# Patient Record
Sex: FEMALE | Race: WHITE | NOT HISPANIC OR LATINO | Employment: FULL TIME | URBAN - METROPOLITAN AREA
[De-identification: names, ages, dates, MRNs, and addresses within clinical notes are randomized per-mention and may not be internally consistent; named-entity substitution may affect disease eponyms.]

---

## 2017-02-03 ENCOUNTER — TELEPHONE (OUTPATIENT)
Dept: FAMILY MEDICINE | Facility: CLINIC | Age: 27
End: 2017-02-03

## 2017-02-03 DIAGNOSIS — Z30.41 ENCOUNTER FOR SURVEILLANCE OF CONTRACEPTIVE PILLS: Primary | ICD-10-CM

## 2017-02-03 RX ORDER — DROSPIRENONE AND ETHINYL ESTRADIOL 0.02-3(28)
1 KIT ORAL DAILY
Qty: 84 TABLET | Refills: 3 | Status: SHIPPED | OUTPATIENT
Start: 2017-02-03 | End: 2018-09-24

## 2017-02-03 NOTE — TELEPHONE ENCOUNTER
[place] 28's      Last Written Prescription Date:  n/a  Last Fill Quantity: 90,   # refills: 4  Last Office Visit with [facility], P or [facility] prescribing provider: 9/13/16  Future Office visit:       Routing refill request to provider for review/approval because:  Not on patient medication list-pt reported    This was not called in by patient-this was sent by [facility] via fax

## 2017-02-03 NOTE — TELEPHONE ENCOUNTER
Luciana -- please review. Unsure if this is the same as what she was taking before.    Thank you  HOWARD VargasN, RN  [facility]

## 2017-09-21 DIAGNOSIS — Z30.41 ENCOUNTER FOR SURVEILLANCE OF CONTRACEPTIVE PILLS: ICD-10-CM

## 2017-09-21 NOTE — TELEPHONE ENCOUNTER
Anamika Oral Tablet 3-0.02 MG        Last Written Prescription Date:  2/3/17  Last Fill Quantity: 84,   # refills: 3  Last Office Visit with [email or web address] or [facility] prescribing provider: 9/13/16  Future Office visit:       Routing refill request to provider for review/approval because:  Drug not on the [facility], [email or web address] or [facility] refill protocol or controlled substance

## 2017-09-22 RX ORDER — DROSPIRENONE AND ETHINYL ESTRADIOL 0.02-3(28)
KIT ORAL
Qty: 28 TABLET | Refills: 0 | Status: SHIPPED | OUTPATIENT
Start: 2017-09-22 | End: 2017-11-10

## 2017-09-22 NOTE — TELEPHONE ENCOUNTER
Prescription approved per [facility] Refill Protocol.    Sonya Canales RN  [facility]

## 2017-11-07 ENCOUNTER — OFFICE VISIT (OUTPATIENT)
Dept: FAMILY MEDICINE | Facility: CLINIC | Age: 27
End: 2017-11-07
Payer: COMMERCIAL

## 2017-11-07 VITALS
WEIGHT: 134.4 LBS | DIASTOLIC BLOOD PRESSURE: 72 MMHG | TEMPERATURE: 97.7 F | HEIGHT: 69 IN | SYSTOLIC BLOOD PRESSURE: 131 MMHG | HEART RATE: 71 BPM | OXYGEN SATURATION: 99 % | BODY MASS INDEX: 19.91 KG/M2

## 2017-11-07 DIAGNOSIS — D50.8 IRON DEFICIENCY ANEMIA SECONDARY TO INADEQUATE DIETARY IRON INTAKE: ICD-10-CM

## 2017-11-07 DIAGNOSIS — Z00.00 ROUTINE GENERAL MEDICAL EXAMINATION AT A HEALTH CARE FACILITY: Primary | ICD-10-CM

## 2017-11-07 DIAGNOSIS — R68.83 CHILLS (WITHOUT FEVER): ICD-10-CM

## 2017-11-07 DIAGNOSIS — Z12.4 SCREENING FOR CERVICAL CANCER: ICD-10-CM

## 2017-11-07 DIAGNOSIS — R19.7 DIARRHEA, UNSPECIFIED TYPE: ICD-10-CM

## 2017-11-07 LAB
ERYTHROCYTE [DISTWIDTH] IN BLOOD BY AUTOMATED COUNT: 17.9 % (ref 10–15)
HCT VFR BLD AUTO: 28.6 % (ref 35–47)
HGB BLD-MCNC: 8.3 G/DL (ref 11.7–15.7)
MCH RBC QN AUTO: 19.2 PG (ref 26.5–33)
MCHC RBC AUTO-ENTMCNC: 29 G/DL (ref 31.5–36.5)
MCV RBC AUTO: 66 FL (ref 78–100)
PLATELET # BLD AUTO: 347 10E9/L (ref 150–450)
RBC # BLD AUTO: 4.32 10E12/L (ref 3.8–5.2)
TSH SERPL DL<=0.005 MIU/L-ACNC: 1.68 MU/L (ref 0.4–4)
WBC # BLD AUTO: 9.5 10E9/L (ref 4–11)

## 2017-11-07 PROCEDURE — 85027 COMPLETE CBC AUTOMATED: CPT | Performed by: NURSE PRACTITIONER

## 2017-11-07 PROCEDURE — G0145 SCR C/V CYTO,THINLAYER,RESCR: HCPCS | Performed by: NURSE PRACTITIONER

## 2017-11-07 PROCEDURE — 99395 PREV VISIT EST AGE 18-39: CPT | Performed by: NURSE PRACTITIONER

## 2017-11-07 PROCEDURE — 84443 ASSAY THYROID STIM HORMONE: CPT | Performed by: NURSE PRACTITIONER

## 2017-11-07 PROCEDURE — 36415 COLL VENOUS BLD VENIPUNCTURE: CPT | Performed by: NURSE PRACTITIONER

## 2017-11-07 PROCEDURE — 99213 OFFICE O/P EST LOW 20 MIN: CPT | Mod: 25 | Performed by: NURSE PRACTITIONER

## 2017-11-07 NOTE — NURSING NOTE
"Chief Complaint   Patient presents with     Physical       Initial /72  Pulse 71  Temp 97.7  F (36.5  C) (Oral)  Ht 5' 9\" (1.753 m)  Wt 134 lb 6.4 oz (61 kg)  SpO2 99%  BMI 19.85 kg/m2 Estimated body mass index is 19.85 kg/(m^2) as calculated from the following:    Height as of this encounter: 5' 9\" (1.753 m).    Weight as of this encounter: 134 lb 6.4 oz (61 kg).  Medication Reconciliation: complete   Lindsay Maldonado MA      "

## 2017-11-07 NOTE — MR AVS SNAPSHOT
After Visit Summary   11/7/2017    Elizabeth Benito    MRN: [id number]           Patient Information     Date Of Birth          1990        Visit Information        Provider Department      11/7/2017 9:00 AM Luciana Becker APRN [facility]        Today's Diagnoses     Diarrhea, unspecified type    -  1    Chills (without fever)        Screening for cervical cancer          Care Instructions      Preventive Health Recommendations  Female Ages 18 to 25     Yearly exam:     See your health care provider every year in order to  o Review health changes.   o Discuss preventive care.    o Review your medicines if your doctor has prescribed any.      You should be tested each year for STDs (sexually transmitted diseases).       After age 20, talk to your provider about how often you should have cholesterol testing.      Starting at age 21, get a Pap test every three years. If you have an abnormal result, your doctor may have you test more often.      If you are at risk for diabetes, you should have a diabetes test (fasting glucose).     Shots:     Get a flu shot each year.     Get a tetanus shot every 10 years.     Consider getting the shot (vaccine) that prevents cervical cancer (Gardasil).    Nutrition:     Eat at least 5 servings of fruits and vegetables each day.    Eat whole-grain bread, whole-wheat pasta and brown rice instead of white grains and rice.    Talk to your provider about Calcium and Vitamin D.     Lifestyle    Exercise at least 150 minutes a week each week (30 minutes a day, 5 days a week). This will help you control your weight and prevent disease.    Limit alcohol to one drink per day.    No smoking.     Wear sunscreen to prevent skin cancer.    See your dentist every six months for an exam and cleaning.          Follow-ups after your visit        Future tests that were ordered for you today     Open Future Orders        Priority Expected Expires Ordered    Ova  "and Parasite Exam Routine Routine  2018            Who to contact     If you have questions or need follow up information about today's clinic visit or your schedule please contact [facility] directly at [phone].  Normal or non-critical lab and imaging results will be communicated to you by MyChart, letter or phone within 4 business days after the clinic has received the results. If you do not hear from us within 7 days, please contact the clinic through Exit41hart or phone. If you have a critical or abnormal lab result, we will notify you by phone as soon as possible.  Submit refill requests through [employer] or call your pharmacy and they will forward the refill request to us. Please allow 3 business days for your refill to be completed.          Additional Information About Your Visit        Exit41harApplango Information     [employer] lets you send messages to your doctor, view your test results, renew your prescriptions, schedule appointments and more. To sign up, go to [email or web address] . Click on \"Log in\" on the left side of the screen, which will take you to the Welcome page. Then click on \"Sign up Now\" on the right side of the page.     You will be asked to enter the access code listed below, as well as some personal information. Please follow the directions to create your username and password.     Your access code is: [email or web address]  Expires: 2018  9:43 AM     Your access code will  in 90 days. If you need help or a new code, please call your [facility] or [phone].        Care EveryWhere ID     This is your Care EveryWhere ID. This could be used by other organizations to access your [place] medical records  [id number]        Your Vitals Were     Pulse Temperature Height Pulse Oximetry BMI (Body Mass Index)       71 97.7  F (36.5  C) (Oral) 5' 9\" (1.753 m) 99% 19.85 kg/m2        Blood Pressure from Last 3 Encounters:   17 131/72   16 132/81 "   09/03/15 109/66    Weight from Last 3 Encounters:   11/07/17 134 lb 6.4 oz (61 kg)   09/13/16 133 lb (60.3 kg)   09/03/15 133 lb 4.8 oz (60.5 kg)              We Performed the Following     CBC with platelets     Pap imaged thin layer screen reflex to HPV if ASCUS - recommend age 25 - 29     TSH with free T4 reflex        Primary Care Provider Office Phone # Fax #    Luciana Becker, APRN [facility] [phone]       [address] [facility] [address]  [facility] [address]        Equal Access to Services     KILLIAN Alliance HospitalVENANCIO : Hadii mani tena hadsoo Sorosa, waaxda jose maria, qacasper kaalmaguy hernandez, jodie espinosa . So [facility] [phone].    ATENCIÓN: Si habla español, tiene a stevenson disposición servicios gratuitos de asistencia lingüística. [facility] [phone].    We comply with applicable federal civil rights laws and [place] laws. We do not discriminate on the basis of race, color, national origin, age, disability, sex, sexual orientation, or gender identity.            Thank you!     Thank you for choosing [facility]  for your care. Our goal is always to provide you with excellent care. Hearing back from our patients is one way we can continue to improve our services. Please take a few minutes to complete the written survey that you may receive in the mail after your visit with us. Thank you!             Your Updated Medication List - Protect others around you: Learn how to safely use, store and throw away your medicines at www.disposemymeds.org.          This list is accurate as of: 11/7/17  9:43 AM.  Always use your most recent med list.                   Brand Name Dispense Instructions for use Diagnosis    * drospirenone-ethinyl estradiol 3-0.02 MG per tablet    ROSIBEL    84 tablet    Take 1 tablet by mouth daily    Encounter for surveillance of contraceptive pills       * SOFÍA 3-0.02 MG per tablet   Generic drug:  drospirenone-ethinyl estradiol     28 tablet    TAKE ONE  TABLET BY MOUTH ONE TIME DAILY    Encounter for surveillance of contraceptive pills       * Notice:  This list has 2 medication(s) that are the same as other medications prescribed for you. Read the directions carefully, and ask your doctor or other care provider to review them with you.

## 2017-11-09 LAB
COPATH REPORT: NORMAL
PAP: NORMAL

## 2017-11-09 RX ORDER — FERROUS SULFATE 325(65) MG
325 TABLET ORAL
Qty: 90 TABLET | Refills: 2 | Status: SHIPPED | OUTPATIENT
Start: 2017-11-09

## 2017-11-09 RX ORDER — RIBOFLAVIN (VITAMIN B2) 100 MG
100 TABLET ORAL 3 TIMES DAILY
Qty: 90 TABLET | Refills: 3 | Status: SHIPPED | OUTPATIENT
Start: 2017-11-09 | End: 2018-09-24

## 2017-11-10 ENCOUNTER — NURSE TRIAGE (OUTPATIENT)
Dept: NURSING | Facility: CLINIC | Age: 27
End: 2017-11-10

## 2017-11-10 DIAGNOSIS — Z30.41 ENCOUNTER FOR SURVEILLANCE OF CONTRACEPTIVE PILLS: ICD-10-CM

## 2017-11-10 RX ORDER — DROSPIRENONE AND ETHINYL ESTRADIOL 0.02-3(28)
KIT ORAL
Qty: 84 TABLET | Refills: 3 | Status: SHIPPED | OUTPATIENT
Start: 2017-11-10 | End: 2018-09-24

## 2017-11-10 NOTE — TELEPHONE ENCOUNTER
Patient calling reporting pharmacy does not have her refill of Birth Control.    Last Written Prescription Date: 9/22/17  Last Fill Quantity: 28,  # refills: 0   Last Office Visit with [facility] or [facility] prescribing provider: 11/7/17    Maria C Garcia RN  [facility]

## 2017-11-10 NOTE — TELEPHONE ENCOUNTER
Patient calling reporting pharmacy does not have her refill of Birth Control.    Last Written Prescription Date: 9/22/17  Last Fill Quantity: 28,  # refills: 0   Last Office Visit with [place], P or [facility] prescribing provider: 11/7/17    Maria C Garcia RN  [facility]

## 2018-09-24 ENCOUNTER — OFFICE VISIT (OUTPATIENT)
Dept: OBGYN | Facility: CLINIC | Age: 28
End: 2018-09-24
Payer: COMMERCIAL

## 2018-09-24 VITALS
HEART RATE: 66 BPM | SYSTOLIC BLOOD PRESSURE: 108 MMHG | HEIGHT: 69 IN | BODY MASS INDEX: 20.08 KG/M2 | DIASTOLIC BLOOD PRESSURE: 62 MMHG | WEIGHT: 135.6 LBS

## 2018-09-24 DIAGNOSIS — D64.9 ANEMIA, UNSPECIFIED TYPE: ICD-10-CM

## 2018-09-24 DIAGNOSIS — Z30.41 ENCOUNTER FOR SURVEILLANCE OF CONTRACEPTIVE PILLS: ICD-10-CM

## 2018-09-24 DIAGNOSIS — Z01.419 ENCOUNTER FOR GYNECOLOGICAL EXAMINATION WITHOUT ABNORMAL FINDING: Primary | ICD-10-CM

## 2018-09-24 DIAGNOSIS — R19.7 DIARRHEA, UNSPECIFIED TYPE: ICD-10-CM

## 2018-09-24 LAB
BASOPHILS # BLD AUTO: 0.1 10E9/L (ref 0–0.2)
BASOPHILS NFR BLD AUTO: 0.5 %
DIFFERENTIAL METHOD BLD: NORMAL
EOSINOPHIL # BLD AUTO: 0.3 10E9/L (ref 0–0.7)
EOSINOPHIL NFR BLD AUTO: 2.8 %
ERYTHROCYTE [DISTWIDTH] IN BLOOD BY AUTOMATED COUNT: 12.3 % (ref 10–15)
HCT VFR BLD AUTO: 38.5 % (ref 35–47)
HGB BLD-MCNC: 12.7 G/DL (ref 11.7–15.7)
LYMPHOCYTES # BLD AUTO: 3.3 10E9/L (ref 0.8–5.3)
LYMPHOCYTES NFR BLD AUTO: 33.4 %
MCH RBC QN AUTO: 30.1 PG (ref 26.5–33)
MCHC RBC AUTO-ENTMCNC: 33 G/DL (ref 31.5–36.5)
MCV RBC AUTO: 91 FL (ref 78–100)
MONOCYTES # BLD AUTO: 0.8 10E9/L (ref 0–1.3)
MONOCYTES NFR BLD AUTO: 8.4 %
NEUTROPHILS # BLD AUTO: 5.4 10E9/L (ref 1.6–8.3)
NEUTROPHILS NFR BLD AUTO: 54.9 %
PLATELET # BLD AUTO: 243 10E9/L (ref 150–450)
RBC # BLD AUTO: 4.22 10E12/L (ref 3.8–5.2)
WBC # BLD AUTO: 9.8 10E9/L (ref 4–11)

## 2018-09-24 PROCEDURE — 36415 COLL VENOUS BLD VENIPUNCTURE: CPT | Performed by: OBSTETRICS & GYNECOLOGY

## 2018-09-24 PROCEDURE — 99385 PREV VISIT NEW AGE 18-39: CPT | Performed by: OBSTETRICS & GYNECOLOGY

## 2018-09-24 PROCEDURE — 83550 IRON BINDING TEST: CPT | Performed by: OBSTETRICS & GYNECOLOGY

## 2018-09-24 PROCEDURE — 87177 OVA AND PARASITES SMEARS: CPT | Performed by: NURSE PRACTITIONER

## 2018-09-24 PROCEDURE — 85025 COMPLETE CBC W/AUTO DIFF WBC: CPT | Performed by: OBSTETRICS & GYNECOLOGY

## 2018-09-24 PROCEDURE — 82728 ASSAY OF FERRITIN: CPT | Performed by: OBSTETRICS & GYNECOLOGY

## 2018-09-24 PROCEDURE — 99000 SPECIMEN HANDLING OFFICE-LAB: CPT | Performed by: OBSTETRICS & GYNECOLOGY

## 2018-09-24 PROCEDURE — 83540 ASSAY OF IRON: CPT | Performed by: OBSTETRICS & GYNECOLOGY

## 2018-09-24 PROCEDURE — 83021 HEMOGLOBIN CHROMOTOGRAPHY: CPT | Mod: 90 | Performed by: OBSTETRICS & GYNECOLOGY

## 2018-09-24 RX ORDER — DROSPIRENONE AND ETHINYL ESTRADIOL 0.02-3(28)
1 KIT ORAL DAILY
Qty: 84 TABLET | Refills: 4 | Status: SHIPPED | OUTPATIENT
Start: 2018-09-24 | End: 2019-01-29

## 2018-09-24 ASSESSMENT — ANXIETY QUESTIONNAIRES
GAD7 TOTAL SCORE: 4
6. BECOMING EASILY ANNOYED OR IRRITABLE: SEVERAL DAYS
IF YOU CHECKED OFF ANY PROBLEMS ON THIS QUESTIONNAIRE, HOW DIFFICULT HAVE THESE PROBLEMS MADE IT FOR YOU TO DO YOUR WORK, TAKE CARE OF THINGS AT HOME, OR GET ALONG WITH OTHER PEOPLE: SOMEWHAT DIFFICULT
7. FEELING AFRAID AS IF SOMETHING AWFUL MIGHT HAPPEN: NOT AT ALL
5. BEING SO RESTLESS THAT IT IS HARD TO SIT STILL: SEVERAL DAYS
3. WORRYING TOO MUCH ABOUT DIFFERENT THINGS: NOT AT ALL
1. FEELING NERVOUS, ANXIOUS, OR ON EDGE: SEVERAL DAYS
2. NOT BEING ABLE TO STOP OR CONTROL WORRYING: NOT AT ALL

## 2018-09-24 ASSESSMENT — PATIENT HEALTH QUESTIONNAIRE - PHQ9: 5. POOR APPETITE OR OVEREATING: SEVERAL DAYS

## 2018-09-24 NOTE — MR AVS SNAPSHOT
After Visit Summary   9/24/2018    Elizabeth Benito    MRN: [id number]           Patient Information     Date Of Birth          1990        Visit Information        Provider Department      9/24/2018 3:30 PM Rose Marie Montiel MD [facility]        Today's Diagnoses     Encounter for gynecological examination without abnormal finding    -  1    Encounter for surveillance of contraceptive pills        Anemia, unspecified type        Diarrhea, unspecified type           Follow-ups after your visit        Future tests that were ordered for you today     Open Future Orders        Priority Expected Expires Ordered    Ova and Parasite Exam Routine Routine  9/24/2019 9/24/2018            Who to contact     If you have questions or need follow up information about today's clinic visit or your schedule please contact [facility] directly at [phone].  Normal or non-critical lab and imaging results will be communicated to you by MyChart, letter or phone within 4 business days after the clinic has received the results. If you do not hear from us within 7 days, please contact the clinic through MyChart or phone. If you have a critical or abnormal lab result, we will notify you by phone as soon as possible.  Submit refill requests through [employer] or call your pharmacy and they will forward the refill request to us. Please allow 3 business days for your refill to be completed.          Additional Information About Your Visit        MyChart Information     [employer] gives you secure access to your electronic health record. If you see a primary care provider, you can also send messages to your care team and make appointments. If you have questions, please call your primary care clinic.  If you do not have a primary care provider, please call [phone] and they will assist you.        Care EveryWhere ID     This is your Care EveryWhere ID. This could be used by  "other organizations to access your [place] medical records  [id number]        Your Vitals Were     Pulse Height Last Period BMI (Body Mass Index)          66 5' 9\" (1.753 m) 09/14/2018 (Exact Date) 20.02 kg/m2         Blood Pressure from Last 3 Encounters:   09/24/18 108/62   11/07/17 131/72   09/13/16 132/81    Weight from Last 3 Encounters:   09/24/18 135 lb 9.6 oz (61.5 kg)   11/07/17 134 lb 6.4 oz (61 kg)   09/13/16 133 lb (60.3 kg)              We Performed the Following     CBC with platelets differential     Ferritin     HGB Eval Reflex to ELP or RBC Solubility     Iron and iron binding capacity          Today's Medication Changes          These changes are accurate as of 9/24/18  5:35 PM.  If you have any questions, ask your nurse or doctor.               These medicines have changed or have updated prescriptions.        Dose/Directions    drospirenone-ethinyl estradiol 3-0.02 MG per tablet   Commonly known as:  SOFÍA   This may have changed:  See the new instructions.   Used for:  Encounter for surveillance of contraceptive pills   Changed by:  Rose Marie Montiel MD        Dose:  1 tablet   Take 1 tablet by mouth daily   Quantity:  84 tablet   Refills:  4            Where to get your medicines      These medications were sent to [id number] [address] [address]     Phone:  [phone]     drospirenone-ethinyl estradiol 3-0.02 MG per tablet                Primary Care Provider Office Phone # Fax #    Luciana Becker, APRN [facility] [phone]       [address] [facility] [address]  [facility] [address]        Equal Access to Services     KILLIAN LESTER AH: Brenda Bryan, wayolandeda luqnancy, qacasper kaalmada mary, jodie lan. So [facility] [place] [phone].    ATENCIÓN: Si habla español, tiene a stevenson disposición servicios gratuitos de asistencia lingüística. Llame al [phone].    We comply with " applicable federal civil rights laws and [place] laws. We do not discriminate on the basis of race, color, national origin, age, disability, sex, sexual orientation, or gender identity.            Thank you!     Thank you for choosing [facility]  for your care. Our goal is always to provide you with excellent care. Hearing back from our patients is one way we can continue to improve our services. Please take a few minutes to complete the written survey that you may receive in the mail after your visit with us. Thank you!             Your Updated Medication List - Protect others around you: Learn how to safely use, store and throw away your medicines at www.disposemymeds.org.          This list is accurate as of 9/24/18  5:35 PM.  Always use your most recent med list.                   Brand Name Dispense Instructions for use Diagnosis    drospirenone-ethinyl estradiol 3-0.02 MG per tablet    SOFÍA    84 tablet    Take 1 tablet by mouth daily    Encounter for surveillance of contraceptive pills       ferrous sulfate 325 (65 Fe) MG tablet    IRON    90 tablet    Take 1 tablet (325 mg) by mouth 3 times daily (with meals)    Iron deficiency anemia secondary to inadequate dietary iron intake

## 2018-09-24 NOTE — PROGRESS NOTES
Elizabeth is a 28 year old No obstetric history on file. female who presents for annual exam.     Besides routine health maintenance, she would like to discuss stool screening-patient has anxiety about worms in her stool as well as loose. Traveled to [facility] [place] recently within the last year. Was also in [place] [facility] (2017) and [place] (2016). Patient was told at her last yearly exam that she was anemic-would like labs for this as well. She would like to discuss IUD options.    HPI:  Elizabeth is here as a referral from her sister to establish care. She has had a prolonged history of iron deficiency anemia or unknown origin. She has been on OCPs for 3 years and would like to explore a long acting reversible contraceptive option. She is also on OCPs for acne and notes a marked improvement in her acne symptoms. She is engaged and moving in with her fiance. She denies intimate partner violence. She is also concerned about possible parasitic exposure while traveling in [place] and has been delayed in submitting a stool ova and parasites sample.      GYNECOLOGIC HISTORY:    Patient's last menstrual period was 2018 (exact date).  Her current contraception method is: oral contraceptives.  She  reports that she has never smoked. She has never used smokeless tobacco.    Patient is sexually active.  STD testing offered?  Declined  Last PHQ-9 score on record =   PHQ-9 SCORE 2018   Total Score 1     Last GAD7 score on record =   DALILA-7 SCORE 2018   Total Score 4     Alcohol Score = 2    HEALTH MAINTENANCE:  Cholesterol: (No results found for: CHOL   Last Mammo: NA, mother diagnosed with breast cancer, PGF also diagnosed, unsure of age  Pap: 19 neg  Colonoscopy: NA, not due until age 52  Dexa: Never    Health maintenance updated:  yes    HISTORY:  Obstetric History       T0      L0     SAB0   TAB0   Ectopic0   Multiple0   Live Births0           Patient Active Problem List   Diagnosis     Acne     POD  "(perioral dermatitis)     History reviewed. No pertinent surgical history.   Social History   Substance Use Topics     Smoking status: Never Smoker     Smokeless tobacco: Never Used     Alcohol use Yes      Comment: Occasionally      Problem (# of Occurrences) Relation (Name,Age of Onset)    Alzheimer Disease (1) Maternal Grandmother    Breast Cancer (1) Mother    Thyroid Disease (1) Mother       Negative family history of: Cancer            Current Outpatient Prescriptions   Medication Sig     drospirenone-ethinyl estradiol (SOFÍA) 3-0.02 MG per tablet Take 1 tablet by mouth daily     ferrous sulfate (IRON) 325 (65 FE) MG tablet Take 1 tablet (325 mg) by mouth 3 times daily (with meals)     [DISCONTINUED] drospirenone-ethinyl estradiol (ROSIBEL) 3-0.02 MG per tablet Take 1 tablet by mouth daily (Patient not taking: Reported on 9/24/2018)     [DISCONTINUED] SOFÍA 3-0.02 MG per tablet TAKE ONE TABLET BY MOUTH ONE TIME DAILY     No current facility-administered medications for this visit.      No Known Allergies    Past medical, surgical, social and family histories were reviewed and updated in EPIC.    ROS:   12 point review of systems negative other than symptoms noted below.  Gastrointestinal: Diarrhea    EXAM:  /62  Pulse 66  Ht 5' 9\" (1.753 m)  Wt 135 lb 9.6 oz (61.5 kg)  LMP 09/14/2018 (Exact Date)  BMI 20.02 kg/m2   BMI: Body mass index is 20.02 kg/(m^2).    PHYSICAL EXAM:  Constitutional:  Appearance: Well nourished, well developed, alert, in no acute distress  Neck:  Lymph Nodes:  No lymphadenopathy present    Thyroid:  Gland size normal, nontender, no nodules or masses present on palpation  Chest:  Respiratory Effort:  Breathing unlabored, clear to auscultation bilaterally.  Cardiovascular:    Heart: Auscultation:  Regular rate, normal rhythm, no murmurs present  Breasts: Palpation of Breasts and Axillae:  No masses present on palpation, no breast tenderness., Axillary Lymph Nodes:  No lymphadenopathy " present., No nodularity, asymmetry or nipple discharge bilaterally. and no visble skin changes.  Gastrointestinal:   Abdominal Examination:  Abdomen nontender to palpation, tone normal without rigidity or guarding, no masses present, umbilicus without lesions   Liver and Spleen:  No hepatomegaly present, liver nontender to palpation    Hernias:  No hernias present  Lymphatic: Lymph Nodes:  No other lymphadenopathy present  Skin:  General Inspection:  No rashes present, no lesions present, no areas of  discoloration    Genitalia and Groin:  No rashes present, no lesions present, no areas of  discoloration, no masses present  Neurologic/Psychiatric:    Mental Status:  Oriented X3     Pelvic Exam:  External Genitalia:     Normal appearance for age, no discharge present, no tenderness present, no inflammatory lesions present, color normal  Vagina:     No masses  Bladder:     Nontender to palpation  Urethra:   Urethral Body:  Urethra palpation normal, urethra structural support normal   Urethral Meatus:  No erythema or lesions present  Cervix:     Appearance healthy, no lesions present, nontender to palpation, no bleeding present  Uterus:     Uterus: firm, normal sized and nontender, retroverted in position.   Adnexa:     No adnexal tenderness present, no adnexal masses present  Perineum:     Perineum within normal limits, no evidence of trauma, no rashes or skin lesions present  Genitalia and Groin:     No rashes present, no lesions present, no areas of discoloration, no masses present      COUNSELING:   Reviewed preventive health counseling, as reflected in patient instructions       Contraception    BMI: Body mass index is 20.02 kg/(m^2).      ASSESSMENT:  28 year old female with satisfactory annual exam.    ICD-10-CM    1. Encounter for gynecological examination without abnormal finding Z01.419    2. Encounter for surveillance of contraceptive pills Z30.41 drospirenone-ethinyl estradiol (SOFÍA) 3-0.02 MG per tablet    3. Anemia, unspecified type D64.9 CBC with platelets differential     Iron and iron binding capacity     Ferritin     HGB Eval Reflex to ELP or RBC Solubility   4. Diarrhea, unspecified type R19.7 Ova and Parasite Exam Routine     CANCELED: Ova and Parasite Exam Routine       PLAN:  Pap smear not indicated today.    We discussed the potential worsening of her acne with progesterone LARC devices. We discussed the Nuva Ring but she would like to continue with the oral contraceptive pill at this time.    Given her family history of breast cancer in her paternal grand father she was encouraged to discuss with her family about her paternal aunts and father being tested for the BRCA gene. As male breast cancer is associated with BRCA2.    Anemia: will recheck her hemoglobin today and send iron levels. Will also release her order for ova and parasites an follow up with the results.       Rose Marie Montiel MD

## 2018-09-25 ASSESSMENT — PATIENT HEALTH QUESTIONNAIRE - PHQ9: SUM OF ALL RESPONSES TO PHQ QUESTIONS 1-9: 1

## 2018-09-25 ASSESSMENT — ANXIETY QUESTIONNAIRES: GAD7 TOTAL SCORE: 4

## 2018-09-26 LAB
FERRITIN SERPL-MCNC: 18 NG/ML (ref 12–150)
HGB A1 MFR BLD: 97 % (ref 95–97.9)
HGB A2 MFR BLD: 2.7 % (ref 2–3.5)
HGB C MFR BLD: 0 % (ref 0–0)
HGB E MFR BLD: 0 % (ref 0–0)
HGB F MFR BLD: 0.3 % (ref 0–2.1)
HGB FRACT BLD ELPH-IMP: NORMAL
HGB OTHER MFR BLD: 0 % (ref 0–0)
HGB S BLD QL SOLY: NORMAL
HGB S MFR BLD: 0 % (ref 0–0)
IRON SATN MFR SERPL: 28 % (ref 15–46)
IRON SERPL-MCNC: 104 UG/DL (ref 35–180)
PATH INTERP BLD-IMP: NORMAL
TIBC SERPL-MCNC: 368 UG/DL (ref 240–430)

## 2018-10-05 DIAGNOSIS — R19.7 DIARRHEA, UNSPECIFIED TYPE: ICD-10-CM

## 2018-10-05 PROCEDURE — 87177 OVA AND PARASITES SMEARS: CPT | Performed by: OBSTETRICS & GYNECOLOGY

## 2018-10-05 PROCEDURE — 87209 SMEAR COMPLEX STAIN: CPT | Performed by: OBSTETRICS & GYNECOLOGY

## 2018-10-08 LAB
O+P STL MICRO: NORMAL
O+P STL MICRO: NORMAL
SPECIMEN SOURCE: NORMAL

## 2019-01-29 DIAGNOSIS — Z30.41 ENCOUNTER FOR SURVEILLANCE OF CONTRACEPTIVE PILLS: ICD-10-CM

## 2019-01-29 RX ORDER — DROSPIRENONE AND ETHINYL ESTRADIOL 0.02-3(28)
1 KIT ORAL DAILY
Qty: 84 TABLET | Refills: 2 | Status: SHIPPED | OUTPATIENT
Start: 2019-01-29 | End: 2019-08-08

## 2019-01-29 NOTE — TELEPHONE ENCOUNTER
"Requested Prescriptions   Pending Prescriptions Disp Refills     drospirenone-ethinyl estradiol (SOFÍA) 3-0.02 MG tablet 84 tablet 4     Sig: Take 1 tablet by mouth daily    Contraceptives Protocol Passed - 1/29/2019 10:35 AM       Passed - Patient is not a current smoker if age is 35 or older       Passed - Recent (12 mo) or future (30 days) visit within the authorizing provider's specialty    Patient had office visit in the last 12 months or has a visit in the next 30 days with authorizing provider or within the authorizing provider's specialty.  See \"Patient Info\" tab in inbasket, or \"Choose Columns\" in Meds & Orders section of the refill encounter.             Passed - Medication is active on med list       Passed - No active pregnancy on record       Passed - No positive pregnancy test in past 12 months      Last Written Prescription Date:  9/24/18  Last Fill Quantity: 84,  # refills: 4   Last office visit: 9/24/2018 with prescribing provider:  9/24/18 Dr. Montiel   Future Office Visit:    Left message to call back to confirm that [facility] and [facility] at [facility] pharmacy will be discontinued.       "

## 2019-01-29 NOTE — TELEPHONE ENCOUNTER
Pt returned call and confirmed her request to transfer Rx to [facility].  She is aware Rx will be cancelled at [facility].  Pt verbalized understanding and no further questions.  [facility] per pt request until annual due 9/24/19  Rx at [facility] cancelled.

## 2019-01-29 NOTE — TELEPHONE ENCOUNTER
"Request for 90 day supply from [facility] delivery pharmacy.  Original RX sent to [id number] - [place], [place] - [address]      Requested Prescriptions   Pending Prescriptions Disp Refills     drospirenone-ethinyl estradiol (SOFÍA) 3-0.02 MG tablet 84 tablet 4     Sig: Take 1 tablet by mouth daily    Contraceptives Protocol Passed - 1/29/2019 10:35 AM       Passed - Patient is not a current smoker if age is 35 or older       Passed - Recent (12 mo) or future (30 days) visit within the authorizing provider's specialty    Patient had office visit in the last 12 months or has a visit in the next 30 days with authorizing provider or within the authorizing provider's specialty.  See \"Patient Info\" tab in inbasket, or \"Choose Columns\" in Meds & Orders section of the refill encounter.             Passed - Medication is active on med list       Passed - No active pregnancy on record       Passed - No positive pregnancy test in past 12 months        Last Written Prescription Date:  9/24/18  Last Fill Quantity: 84,  # refills: 4   Last office visit: 9/24/2018 with prescribing provider:  Dr Rose Marie Montiel   Future Office Visit:  none      "

## 2019-08-07 NOTE — PROGRESS NOTES
Elizabeth is a 29 year old  female who presents for annual exam.     Besides routine health maintenance, circulation and sleep issues and some premature gray hair happening.    HPI:    Doing well overall. Has been having some substances in her stools that look like worms but she is not sure. She has also been having worsening numbness in her fingers and in her toes to the point of a change in color. She is getting  in September at [facility] and then going to [place] for the [place].     The patient's PCP is MERARY Rosas CNP.      GYNECOLOGIC HISTORY:    Patient's last menstrual period was 2019.  Her current contraception method is: oral contraceptives.  She  reports that she has never smoked. She has never used smokeless tobacco.  Patient is sexually active.  STD testing offered?  Declined     Last PHQ-9 score on record =   PHQ-9 SCORE 2019   PHQ-9 Total Score 0     Last GAD7 score on record =   DALILA-7 SCORE 2019   Total Score 0     Alcohol Score = 3    HEALTH MAINTENANCE:  Cholesterol: no previous screening  Last Mammo: N/A, Result: not applicable, Next Mammo: age 42   Pap:   Lab Results   Component Value Date    PAP NIL 2017    PAP NIL 2014      Colonoscopy:  N/A, Result: not applicable, Next Colonoscopy: age 52 years.  Dexa:  N/A  Health maintenance updated:  yes    HISTORY:  OB History    Para Term  AB Living   0 0 0 0 0 0   SAB TAB Ectopic Multiple Live Births   0 0 0 0 0       Patient Active Problem List   Diagnosis     Acne     POD (perioral dermatitis)     No past surgical history on file.   Social History     Tobacco Use     Smoking status: Never Smoker     Smokeless tobacco: Never Used   Substance Use Topics     Alcohol use: Yes     Comment: Occasionally      Problem (# of Occurrences) Relation (Name,Age of Onset)    Alzheimer Disease (1) Maternal Grandmother    Breast Cancer (1) Mother    Thyroid Disease (1) Mother       Negative  "family history of: Cancer            Current Outpatient Medications   Medication Sig     drospirenone-ethinyl estradiol (SOFÍA) 3-0.02 MG tablet Take 1 tablet by mouth daily     ferrous sulfate (IRON) 325 (65 FE) MG tablet Take 1 tablet (325 mg) by mouth 3 times daily (with meals)     No current facility-administered medications for this visit.      No Known Allergies    Past medical, surgical, social and family histories were reviewed and updated in EPIC.    ROS:   12 point review of systems negative other than symptoms noted below.  Genitourinary: Night Sweats    EXAM:  /62   Pulse 66   Ht 1.765 m (5' 9.5\")   Wt 60.8 kg (134 lb)   LMP 08/02/2019   BMI 19.50 kg/m     BMI: Body mass index is 19.5 kg/m .    PHYSICAL EXAM:  Constitutional:  Appearance: Well nourished, well developed, alert, in no acute distress  Neck:  Lymph Nodes:  No lymphadenopathy present    Thyroid:  Gland size normal, nontender, no nodules or masses present on palpation  Chest:  Respiratory Effort:  Breathing unlabored, CTAB  Cardiovascular:    Heart: Auscultation:  Regular rate, normal rhythm, no murmurs present  Breasts: Inspection of Breasts:  No lymphadenopathy present., Palpation of Breasts and Axillae:  No masses present on palpation, no breast tenderness., Axillary Lymph Nodes:  No lymphadenopathy present. and No nodularity, asymmetry or nipple discharge bilaterally.  Gastrointestinal:   Abdominal Examination:  Abdomen nontender to palpation, tone normal without rigidity or guarding, no masses present, umbilicus without lesions   Liver and Spleen:  No hepatomegaly present, liver nontender to palpation    Hernias:  No hernias present  Lymphatic: Lymph Nodes:  No other lymphadenopathy present  Skin:  General Inspection:  No rashes present, no lesions present, no areas of  discoloration    Genitalia and Groin:  No rashes present, no lesions present, no areas of  discoloration, no masses present  Neurologic/Psychiatric:    Mental " Status:  Oriented X3     Pelvic Exam:  External Genitalia:     Normal appearance for age, no discharge present, no tenderness present, no inflammatory lesions present, color normal  Vagina:     Normal vaginal vault without central or paravaginal defects, no discharge present, no inflammatory lesions present, no masses present  Bladder:     Nontender to palpation  Urethra:   Urethral Body:  Urethra palpation normal, urethra structural support normal   Urethral Meatus:  No erythema or lesions present  Cervix:     Appearance healthy, no lesions present, nontender to palpation, no bleeding present  Uterus:     Uterus: firm, normal sized and nontender, anteverted in position.   Adnexa:     No adnexal tenderness present, no adnexal masses present  Perineum:     Perineum within normal limits, no evidence of trauma, no rashes or skin lesions present  Anus:     Anus within normal limits, no hemorrhoids present  Inguinal Lymph Nodes:     No lymphadenopathy present  Pubic Hair:     Normal pubic hair distribution for age  Genitalia and Groin:     No rashes present, no lesions present, no areas of discoloration, no masses present      COUNSELING:   Reviewed preventive health counseling, as reflected in patient instructions       Healthy diet/nutrition    BMI: Body mass index is 19.5 kg/m .      ASSESSMENT:  29 year old female with satisfactory annual exam.    ICD-10-CM    1. Encounter for gynecological examination without abnormal finding Z01.419    2. Encounter for surveillance of contraceptive pills Z30.41 drospirenone-ethinyl estradiol (SOFÍA) 3-0.02 MG tablet       PLAN:  Pap smear was not indicated today  Ok to continue on OCP. Symptoms of being warm at night are likely due to the OCP use.  I offered some screening blood work such as [place], RF and SSA and SSB. She opted to defer that for now.  Stool ova and parasites were negative last year. Offered GI referral if her concern continues.    Rose Marie Montiel MD

## 2019-08-08 ENCOUNTER — OFFICE VISIT (OUTPATIENT)
Dept: OBGYN | Facility: CLINIC | Age: 29
End: 2019-08-08
Payer: COMMERCIAL

## 2019-08-08 VITALS
DIASTOLIC BLOOD PRESSURE: 62 MMHG | HEIGHT: 70 IN | BODY MASS INDEX: 19.18 KG/M2 | HEART RATE: 66 BPM | WEIGHT: 134 LBS | SYSTOLIC BLOOD PRESSURE: 104 MMHG

## 2019-08-08 DIAGNOSIS — Z30.41 ENCOUNTER FOR SURVEILLANCE OF CONTRACEPTIVE PILLS: ICD-10-CM

## 2019-08-08 DIAGNOSIS — Z01.419 ENCOUNTER FOR GYNECOLOGICAL EXAMINATION WITHOUT ABNORMAL FINDING: Primary | ICD-10-CM

## 2019-08-08 PROCEDURE — 99395 PREV VISIT EST AGE 18-39: CPT | Performed by: OBSTETRICS & GYNECOLOGY

## 2019-08-08 RX ORDER — DROSPIRENONE AND ETHINYL ESTRADIOL 0.02-3(28)
1 KIT ORAL DAILY
Qty: 84 TABLET | Refills: 4 | Status: SHIPPED | OUTPATIENT
Start: 2019-08-08 | End: 2020-03-02

## 2019-08-08 ASSESSMENT — ANXIETY QUESTIONNAIRES
GAD7 TOTAL SCORE: 0
6. BECOMING EASILY ANNOYED OR IRRITABLE: NOT AT ALL
1. FEELING NERVOUS, ANXIOUS, OR ON EDGE: NOT AT ALL
7. FEELING AFRAID AS IF SOMETHING AWFUL MIGHT HAPPEN: NOT AT ALL
2. NOT BEING ABLE TO STOP OR CONTROL WORRYING: NOT AT ALL
5. BEING SO RESTLESS THAT IT IS HARD TO SIT STILL: NOT AT ALL
IF YOU CHECKED OFF ANY PROBLEMS ON THIS QUESTIONNAIRE, HOW DIFFICULT HAVE THESE PROBLEMS MADE IT FOR YOU TO DO YOUR WORK, TAKE CARE OF THINGS AT HOME, OR GET ALONG WITH OTHER PEOPLE: NOT DIFFICULT AT ALL
3. WORRYING TOO MUCH ABOUT DIFFERENT THINGS: NOT AT ALL

## 2019-08-08 ASSESSMENT — MIFFLIN-ST. JEOR: SCORE: 1415.13

## 2019-08-08 ASSESSMENT — PATIENT HEALTH QUESTIONNAIRE - PHQ9
SUM OF ALL RESPONSES TO PHQ QUESTIONS 1-9: 0
5. POOR APPETITE OR OVEREATING: NOT AT ALL

## 2019-08-09 ASSESSMENT — ANXIETY QUESTIONNAIRES: GAD7 TOTAL SCORE: 0

## 2020-03-02 ENCOUNTER — PRENATAL OFFICE VISIT (OUTPATIENT)
Dept: OBGYN | Facility: CLINIC | Age: 30
End: 2020-03-02
Payer: COMMERCIAL

## 2020-03-02 ENCOUNTER — HEALTH MAINTENANCE LETTER (OUTPATIENT)
Age: 30
End: 2020-03-02

## 2020-03-02 VITALS
WEIGHT: 133 LBS | HEIGHT: 70 IN | DIASTOLIC BLOOD PRESSURE: 70 MMHG | SYSTOLIC BLOOD PRESSURE: 112 MMHG | BODY MASS INDEX: 19.04 KG/M2

## 2020-03-02 DIAGNOSIS — D50.0 IRON DEFICIENCY ANEMIA DUE TO CHRONIC BLOOD LOSS: ICD-10-CM

## 2020-03-02 DIAGNOSIS — N92.1 BREAKTHROUGH BLEEDING ON OCPS: Primary | ICD-10-CM

## 2020-03-02 LAB — HGB BLD-MCNC: 12.5 G/DL (ref 11.7–15.7)

## 2020-03-02 PROCEDURE — 36415 COLL VENOUS BLD VENIPUNCTURE: CPT | Performed by: OBSTETRICS & GYNECOLOGY

## 2020-03-02 PROCEDURE — 99213 OFFICE O/P EST LOW 20 MIN: CPT | Performed by: OBSTETRICS & GYNECOLOGY

## 2020-03-02 PROCEDURE — 85018 HEMOGLOBIN: CPT | Performed by: OBSTETRICS & GYNECOLOGY

## 2020-03-02 PROCEDURE — 82728 ASSAY OF FERRITIN: CPT | Performed by: OBSTETRICS & GYNECOLOGY

## 2020-03-02 RX ORDER — NORGESTIMATE AND ETHINYL ESTRADIOL 0.25-0.035
1 KIT ORAL DAILY
Qty: 84 TABLET | Refills: 3 | Status: SHIPPED | OUTPATIENT
Start: 2020-03-02 | End: 2020-03-27

## 2020-03-02 RX ORDER — MULTIPLE VITAMINS W/ MINERALS TAB 9MG-400MCG
1 TAB ORAL DAILY
COMMUNITY
End: 2020-10-27

## 2020-03-02 ASSESSMENT — MIFFLIN-ST. JEOR: SCORE: 1410.59

## 2020-03-02 NOTE — PROGRESS NOTES
SUBJECTIVE:                                                   Elizabeth Benito is a 29 year old female who presents to clinic today for the following health issue(s):  Patient presents with:  Abnormal Bleeding Problem: patient has been on the same dose of her OCP for several years. Is now having some irregular bleeding. Would like to discuss switching type of pill or another method.      HPI:  Elizabeth presents with breakthrough bleeding on the OCP. She has been on E2/drsperionone since 2017. She recently returned from a 2 week trip to [place] and even prior to that she had breakthrough bleeding characterized by dark brown blood. She has also noted that she has decreased libido. It shows itself as not initiating sex with her . She is able to enjoy intercourse when it does occur but she does not initiate it. She does not self please but she did do this prior to getting . She states that this is not a huge priority for her. She is open to switching to another method but would only like to try something that will not cause her to have worsened acne.    No LMP recorded. (Menstrual status: Irregular Periods)..   Patient is sexually active, .  Using oral contraceptives for contraception.    reports that she has never smoked. She has never used smokeless tobacco.  STD testing offered?  Declined  Health maintenance updated:  yes    Today's PHQ-2 Score:   PHQ-2 (  [employer]) 2019   Q1: Little interest or pleasure in doing things 0   Q2: Feeling down, depressed or hopeless 0   PHQ-2 Score 0   Q1: Little interest or pleasure in doing things Not at all   Q2: Feeling down, depressed or hopeless Not at all   PHQ-2 Score 0     Today's PHQ-9 Score:   PHQ-9 SCORE 2019   PHQ-9 Total Score 0     Today's DALILA-7 Score:   DALILA-7 SCORE 2019   Total Score 0       Problem list and histories reviewed & adjusted, as indicated.  Additional history: as documented.    Patient Active Problem List   Diagnosis     Acne  "    POD (perioral dermatitis)     History reviewed. No pertinent surgical history.   Social History     Tobacco Use     Smoking status: Never Smoker     Smokeless tobacco: Never Used   Substance Use Topics     Alcohol use: Yes     Comment: Occasionally      Problem (# of Occurrences) Relation (Name,Age of Onset)    Alzheimer Disease (1) Maternal Grandmother    Breast Cancer (1) Mother    Thyroid Disease (1) Mother       Negative family history of: Cancer            Current Outpatient Medications   Medication Sig     Ascorbic Acid (NETTIE-C PO)      drospirenone-ethinyl estradiol (SOFÍA) 3-0.02 MG tablet Take 1 tablet by mouth daily     ferrous sulfate (IRON) 325 (65 FE) MG tablet Take 1 tablet (325 mg) by mouth 3 times daily (with meals)     multivitamin w/minerals (MULTI-VITAMIN) tablet Take 1 tablet by mouth daily     norgestimate-ethinyl estradiol (ORTHO-CYCLEN) 0.25-35 MG-MCG tablet Take 1 tablet by mouth daily     No current facility-administered medications for this visit.      No Known Allergies    ROS:  12 point review of systems negative other than symptoms noted below or in the HPI.  Genitourinary: Irregular Menses and Spotting  No urinary frequency or dysuria, bladder or kidney problems      OBJECTIVE:     /70   Ht 1.765 m (5' 9.5\")   Wt 60.3 kg (133 lb)   BMI 19.36 kg/m    Body mass index is 19.36 kg/m .    Exam:  Constitutional:  Appearance: Well nourished, well developed alert, in no acute distress  Skin: General Inspection:  No rashes present, no lesions present, no areas of discoloration.  Neurologic:  Mental Status:  Oriented X3.  Normal strength and tone, sensory exam grossly normal, mentation intact and speech normal.    Psychiatric:  Mentation appears normal and affect normal/bright.  External Genitalia: no lesions noted.   Cervix: normal without any lesions, no active extravasation of blood during the exam  Vagina: dark brown spotting in the vault.  In-Clinic Test Results:  No results " found for this or any previous visit (from the past 24 hour(s)).    ASSESSMENT/PLAN:                                                        ICD-10-CM    1. Breakthrough bleeding on OCPs N92.1 US Pelvic Complete w Transvaginal     norgestimate-ethinyl estradiol (ORTHO-CYCLEN) 0.25-35 MG-MCG tablet   2. Iron deficiency anemia due to chronic blood loss D50.0 Hemoglobin     Ferritin     Elizabeth was counseled about the potential reasons for her breakthrough bleeding. It could just be due to the pill on it's own not stabilizing her endometrium and in that case another formulation may make a difference. Given her concern about acne we considered progesterones that have a benefit for acne and decided to try Sprintec.  I also recommended considering a pelvic sonogram if the breakthrough bleeding does not improve with the switch then we should get one.  We discussed her decreased desire to have sex. She is not concerned about this at this time. I recommend finding time to self please on her own. We can consider Bupropion in the future if she is interested.  She is no longer a vegeterian and not taking the iron consistently. We will recheck her hemoglobin and ferritin and if normal, we will stop her iron.     Rose Marie Montiel MD  [facility]

## 2020-03-03 LAB — FERRITIN SERPL-MCNC: 10 NG/ML (ref 12–150)

## 2020-03-27 DIAGNOSIS — N92.1 BREAKTHROUGH BLEEDING ON OCPS: ICD-10-CM

## 2020-03-27 RX ORDER — NORGESTIMATE AND ETHINYL ESTRADIOL 0.25-0.035
1 KIT ORAL DAILY
Qty: 84 TABLET | Refills: 3 | Status: SHIPPED | OUTPATIENT
Start: 2020-03-27 | End: 2021-02-08

## 2020-03-27 NOTE — TELEPHONE ENCOUNTER
"Requested Prescriptions   Pending Prescriptions Disp Refills     norgestimate-ethinyl estradiol (ORTHO-CYCLEN) 0.25-35 MG-MCG tablet 84 tablet 3     Sig: Take 1 tablet by mouth daily       Contraceptives Protocol Passed - 3/27/2020  1:25 PM        Passed - Patient is not a current smoker if age is 35 or older        Passed - Recent (12 mo) or future (30 days) visit within the authorizing provider's specialty     Patient has had an office visit with the authorizing provider or a provider within the authorizing providers department within the previous 12 mos or has a future within next 30 days. See \"Patient Info\" tab in inbasket, or \"Choose Columns\" in Meds & Orders section of the refill encounter.              Passed - Medication is active on med list        Passed - No active pregnancy on record        Passed - No positive pregnancy test in past 12 months           Last Written Prescription Date:  3/2/2020  Last Fill Quantity: 84,  # refills: 3   Last office visit: 3/2/2020 with prescribing provider:  Tiana   Future Office Visit:  none  Rx sent to mail order  Prudence Rice RN on 3/27/2020 at 1:27 PM    "

## 2020-04-22 ENCOUNTER — MYC MEDICAL ADVICE (OUTPATIENT)
Dept: OBGYN | Facility: CLINIC | Age: 30
End: 2020-04-22

## 2020-10-27 ENCOUNTER — OFFICE VISIT (OUTPATIENT)
Dept: OBGYN | Facility: CLINIC | Age: 30
End: 2020-10-27
Payer: COMMERCIAL

## 2020-10-27 VITALS
SYSTOLIC BLOOD PRESSURE: 110 MMHG | WEIGHT: 129 LBS | BODY MASS INDEX: 18.47 KG/M2 | HEIGHT: 70 IN | DIASTOLIC BLOOD PRESSURE: 68 MMHG

## 2020-10-27 DIAGNOSIS — Z12.4 SCREENING FOR CERVICAL CANCER: ICD-10-CM

## 2020-10-27 DIAGNOSIS — Z01.419 ENCOUNTER FOR GYNECOLOGICAL EXAMINATION WITHOUT ABNORMAL FINDING: Primary | ICD-10-CM

## 2020-10-27 DIAGNOSIS — Z23 NEED FOR PROPHYLACTIC VACCINATION AND INOCULATION AGAINST INFLUENZA: ICD-10-CM

## 2020-10-27 PROCEDURE — 90686 IIV4 VACC NO PRSV 0.5 ML IM: CPT | Performed by: OBSTETRICS & GYNECOLOGY

## 2020-10-27 PROCEDURE — 99395 PREV VISIT EST AGE 18-39: CPT | Mod: 25 | Performed by: OBSTETRICS & GYNECOLOGY

## 2020-10-27 PROCEDURE — G0145 SCR C/V CYTO,THINLAYER,RESCR: HCPCS | Performed by: OBSTETRICS & GYNECOLOGY

## 2020-10-27 PROCEDURE — 90471 IMMUNIZATION ADMIN: CPT | Performed by: OBSTETRICS & GYNECOLOGY

## 2020-10-27 ASSESSMENT — MIFFLIN-ST. JEOR: SCORE: 1387.45

## 2020-10-27 ASSESSMENT — ANXIETY QUESTIONNAIRES
3. WORRYING TOO MUCH ABOUT DIFFERENT THINGS: MORE THAN HALF THE DAYS
6. BECOMING EASILY ANNOYED OR IRRITABLE: NOT AT ALL
GAD7 TOTAL SCORE: 5
2. NOT BEING ABLE TO STOP OR CONTROL WORRYING: NOT AT ALL
7. FEELING AFRAID AS IF SOMETHING AWFUL MIGHT HAPPEN: NOT AT ALL
IF YOU CHECKED OFF ANY PROBLEMS ON THIS QUESTIONNAIRE, HOW DIFFICULT HAVE THESE PROBLEMS MADE IT FOR YOU TO DO YOUR WORK, TAKE CARE OF THINGS AT HOME, OR GET ALONG WITH OTHER PEOPLE: SOMEWHAT DIFFICULT
1. FEELING NERVOUS, ANXIOUS, OR ON EDGE: SEVERAL DAYS
5. BEING SO RESTLESS THAT IT IS HARD TO SIT STILL: SEVERAL DAYS

## 2020-10-27 ASSESSMENT — PATIENT HEALTH QUESTIONNAIRE - PHQ9
5. POOR APPETITE OR OVEREATING: SEVERAL DAYS
SUM OF ALL RESPONSES TO PHQ QUESTIONS 1-9: 4

## 2020-10-27 NOTE — PROGRESS NOTES
Elizabeth is a 30 year old  female who presents for annual exam.     Besides routine health maintenance, she would like to discuss Raynaud disease and some symptoms, and the change of OCP dose as a side effect.    HPI:  The patient's PCP is  MERARY Rosas CNP. Elizabeth is doing well. She continues to have Raynaud's phenomenon in her fingers and toes despite her best efforts to stay warm. She states that they first started after she started OCPs in . Over the spring they got a puppy but she was very anemic and very sick with worms that she was taken back to the breeder. Not planning on a pregnancy at this moment. Would like to enjoy her  life as it is. They are both working from home.       GYNECOLOGIC HISTORY:    Patient's last menstrual period was 10/20/2020.  Her current contraception method is: oral contraceptives.  She  reports that she has never smoked. She has never used smokeless tobacco.  Patient is sexually active.    Last PHQ-9 score on record =   PHQ-9 SCORE 10/27/2020   PHQ-9 Total Score 4     Last GAD7 score on record =   DALILA-7 SCORE 10/27/2020   Total Score 5       HEALTH MAINTENANCE:  Cholesterol: (No results found for: CHOL   Last Mammo: N/A, Result: not applicable, Next Mammo: screen age 40  Pap:   Lab Results   Component Value Date    PAP NIL 2017    PAP NIL 2014   Colonoscopy:  N/A, Result: not applicable, Next Colonoscopy: screen age 50  Dexa:  N/A  Health maintenance updated:  Due for pap today    HISTORY:  OB History    Para Term  AB Living   0 0 0 0 0 0   SAB TAB Ectopic Multiple Live Births   0 0 0 0 0       Patient Active Problem List   Diagnosis     Acne     POD (perioral dermatitis)     No past surgical history on file.   Social History     Tobacco Use     Smoking status: Never Smoker     Smokeless tobacco: Never Used   Substance Use Topics     Alcohol use: Yes     Comment: Occasionally      Problem (# of Occurrences) Relation (Name,Age of  "Onset)    Alzheimer Disease (1) Maternal Grandmother    Breast Cancer (1) Mother    Thyroid Disease (1) Mother       Negative family history of: Cancer            Current Outpatient Medications   Medication Sig     Ascorbic Acid (NETTIE-C PO)      ferrous sulfate (IRON) 325 (65 FE) MG tablet Take 1 tablet (325 mg) by mouth 3 times daily (with meals)     norgestimate-ethinyl estradiol (ORTHO-CYCLEN) 0.25-35 MG-MCG tablet Take 1 tablet by mouth daily     No current facility-administered medications for this visit.      No Known Allergies    Past medical, surgical, social and family histories were reviewed and updated in EPIC.    ROS:   12 point review of systems negative other than symptoms noted below or in the HPI.  Neurologic: hand numbness  No urinary frequency or dysuria, bladder or kidney problems    EXAM:  /68   Ht 1.765 m (5' 9.5\")   Wt 58.5 kg (129 lb)   LMP 10/20/2020   BMI 18.78 kg/m     BMI: Body mass index is 18.78 kg/m .    PHYSICAL EXAM:  Constitutional:   Appearance: Well nourished, well developed, alert, in no acute distress  Neck:  Lymph Nodes:  No lymphadenopathy present    Thyroid:  Gland size normal, nontender, no nodules or masses present  on palpation  Chest:  Respiratory Effort:  Breathing unlabored  Cardiovascular:    Heart: Auscultation:  Regular rate, normal rhythm, no murmurs present  Breasts: Inspection of Breasts:  No lymphadenopathy present., Palpation of Breasts and Axillae:  No masses present on palpation, no breast tenderness., Axillary Lymph Nodes:  No lymphadenopathy present. and No nodularity, asymmetry or nipple discharge bilaterally.  Gastrointestinal:   Abdominal Examination:  Abdomen nontender to palpation, tone normal without rigidity or guarding, no masses present, umbilicus without lesions   Liver and Spleen:  No hepatomegaly present, liver nontender to palpation    Hernias:  No hernias present  Lymphatic: Lymph Nodes:  No other lymphadenopathy " present  Skin:  General Inspection:  No rashes present, no lesions present, no areas of  discoloration  Neurologic:    Mental Status:  Oriented X3.  Normal strength and tone, sensory exam                grossly normal, mentation intact and speech normal.    Psychiatric:   Mentation appears normal and affect normal/bright.         Pelvic Exam:  External Genitalia:     Normal appearance for age, no discharge present, no tenderness present, no inflammatory lesions present, color normal  Vagina:     Normal vaginal vault without central or paravaginal defects, no discharge present, no inflammatory lesions present, no masses present  Bladder:     Nontender to palpation  Urethra:   Urethral Body:  Urethra palpation normal, urethra structural support normal   Urethral Meatus:  No erythema or lesions present  Cervix:     Appearance healthy, no lesions present, nontender to palpation, no bleeding present  Uterus:     Uterus: firm, normal sized and nontender, retroverted in position.   Adnexa:     No adnexal tenderness present, no adnexal masses present  Perineum:     Perineum within normal limits, no evidence of trauma, no rashes or skin lesions present  Anus:     Anus within normal limits, no hemorrhoids present  Inguinal Lymph Nodes:     No lymphadenopathy present  Pubic Hair:     Normal pubic hair distribution for age  Genitalia and Groin:     No rashes present, no lesions present, no areas of discoloration, no masses present      COUNSELING:   Reviewed preventive health counseling, as reflected in patient instructions       Contraception    BMI: Body mass index is 18.78 kg/m .      ASSESSMENT:  30 year old female with satisfactory annual exam.    ICD-10-CM    1. Encounter for gynecological examination without abnormal finding  Z01.419 Pap imaged thin layer screen reflex to HPV if ASCUS - recommended age 25 - 29 years   2. Screening for cervical cancer  Z12.4 Pap imaged thin layer screen reflex to HPV if ASCUS -  recommended age 25 - 29 years       PLAN:  Pap smear performed.  We discussed a trial period without her OCPs. If there is no improvement I recommend a low dose of a Ca channel blocker such as Amlodipine 5mg and if there is no improvement after that then I would recommend seeing a Rheumatologist.    Rose Marie Montiel MD

## 2020-10-28 ASSESSMENT — ANXIETY QUESTIONNAIRES: GAD7 TOTAL SCORE: 5

## 2020-11-02 LAB
COPATH REPORT: NORMAL
PAP: NORMAL

## 2021-01-09 ENCOUNTER — MYC MEDICAL ADVICE (OUTPATIENT)
Dept: OBGYN | Facility: CLINIC | Age: 31
End: 2021-01-09

## 2021-01-09 DIAGNOSIS — I73.00 RAYNAUD'S DISEASE WITHOUT GANGRENE: Primary | ICD-10-CM

## 2021-01-11 RX ORDER — NIFEDIPINE 30 MG
30 TABLET, EXTENDED RELEASE ORAL DAILY
Qty: 90 TABLET | Refills: 3 | Status: SHIPPED | OUTPATIENT
Start: 2021-01-11 | End: 2021-04-06

## 2021-01-11 NOTE — TELEPHONE ENCOUNTER
Routing mychart msg to provider for review/recommendation.    Kami Dominguez RN on 1/11/2021 at 9:31 AM

## 2021-02-08 DIAGNOSIS — N92.1 BREAKTHROUGH BLEEDING ON OCPS: ICD-10-CM

## 2021-02-08 RX ORDER — NORGESTIMATE AND ETHINYL ESTRADIOL 0.25-0.035
1 KIT ORAL DAILY
Qty: 84 TABLET | Refills: 0 | Status: SHIPPED | OUTPATIENT
Start: 2021-02-08 | End: 2021-04-06

## 2021-02-08 NOTE — TELEPHONE ENCOUNTER
Requested Prescriptions   Pending Prescriptions Disp Refills     norgestimate-ethinyl estradiol (ORTHO-CYCLEN) 0.25-35 MG-MCG tablet 84 tablet 3     Sig: Take 1 tablet by mouth daily       There is no refill protocol information for this order        Last Written Prescription Date:  3/27/20  Last Fill Quantity: 84,  # refills: 3   Last office visit: 10/27/2020 with prescribing provider:  Dr Montiel   Future Office Visit:  None    Prescription approved per [facility] Refill Protocol.  Prudence Rice RN on 2/8/2021 at 2:18 PM

## 2021-05-03 DIAGNOSIS — N92.1 BREAKTHROUGH BLEEDING ON OCPS: ICD-10-CM

## 2021-05-03 RX ORDER — NORGESTIMATE AND ETHINYL ESTRADIOL 0.25-0.035
1 KIT ORAL DAILY
Qty: 84 TABLET | Refills: 0 | Status: SHIPPED | OUTPATIENT
Start: 2021-05-03 | End: 2021-07-26

## 2021-05-03 NOTE — TELEPHONE ENCOUNTER
Requested Prescriptions   Pending Prescriptions Disp Refills     norgestimate-ethinyl estradiol (ORTHO-CYCLEN) 0.25-35 MG-MCG tablet 28 tablet 0     Sig: Take 1 tablet by mouth daily       There is no refill protocol information for this order        Last Written Prescription Date:  4/6/21  Last Fill Quantity: 84,  # refills: 2   Last office visit: 10/27/2020 with prescribing provider:  Dr Montiel   Future Office Visit:  None

## 2021-05-26 DIAGNOSIS — I73.00 RAYNAUD'S DISEASE WITHOUT GANGRENE: ICD-10-CM

## 2021-05-26 RX ORDER — NIFEDIPINE 30 MG
30 TABLET, EXTENDED RELEASE ORAL DAILY
Qty: 90 TABLET | Refills: 2 | Status: CANCELLED | OUTPATIENT
Start: 2021-05-26

## 2021-05-26 NOTE — TELEPHONE ENCOUNTER
"REQUEST FROM [facility] ORDER [facility]ARM[facility]    Requested Prescriptions   Pending Prescriptions Disp Refills     NIFEdipine ER (ADALAT CC) 30 MG 24 hr tablet 90 tablet 2     Sig: Take 1 tablet (30 mg) by mouth daily       Calcium Channel Blockers Protocol  Failed - 5/26/2021  4:11 PM        Failed - Normal serum creatinine on file in past 12 months     No lab results found.    Ok to refill medication if creatinine is low          Passed - Blood pressure under 140/90 in past 12 months     BP Readings from Last 3 Encounters:   10/27/20 110/68   03/02/20 112/70   08/08/19 104/62                 Passed - Recent (12 mo) or future (30 days) visit within the authorizing provider's specialty     Patient has had an office visit with the authorizing provider or a provider within the authorizing providers department within the previous 12 mos or has a future within next 30 days. See \"Patient Info\" tab in inbasket, or \"Choose Columns\" in Meds & Orders section of the refill encounter.              Passed - Medication is active on med list        Passed - Patient is age 18 or older        Passed - No active pregnancy on record        Passed - No positive pregnancy test in past 12 months           Last Written Prescription Date:  4/6/21  Last Fill Quantity: 90,  # refills: 2  Sent to  [id number] - [place], [place] - [address] [address]  Last office visit: 10/27/2020 with prescribing provider:  10/27/20   Future Office Visit:  None    REQUEST FROM [facility] PATIENT WOULD LIKE TO RECEIVE 90 DAY SUPPLY BY MAIL.    90 supply was sent on 4/6/21  Prudence Rice RN on 5/26/2021 at 4:23 PM          "

## 2021-05-28 DIAGNOSIS — I73.00 RAYNAUD'S DISEASE WITHOUT GANGRENE: ICD-10-CM

## 2021-05-28 RX ORDER — NIFEDIPINE 30 MG
30 TABLET, EXTENDED RELEASE ORAL DAILY
Qty: 90 TABLET | Refills: 2 | Status: SHIPPED | OUTPATIENT
Start: 2021-05-28 | End: 2021-11-01

## 2021-05-28 NOTE — PROGRESS NOTES
Received fax pt is requesting mail order for Nifedipine. Rx sent  Prudence Rice RN on 5/28/2021 at 4:02 PM

## 2021-07-26 DIAGNOSIS — N92.1 BREAKTHROUGH BLEEDING ON OCPS: ICD-10-CM

## 2021-07-26 RX ORDER — NORGESTIMATE AND ETHINYL ESTRADIOL 0.25-0.035
1 KIT ORAL DAILY
Qty: 84 TABLET | Refills: 0 | Status: SHIPPED | OUTPATIENT
Start: 2021-07-26 | End: 2021-10-18

## 2021-08-23 ENCOUNTER — TELEPHONE (OUTPATIENT)
Dept: OBGYN | Facility: CLINIC | Age: 31
End: 2021-08-23

## 2021-08-23 ENCOUNTER — MYC MEDICAL ADVICE (OUTPATIENT)
Dept: OBGYN | Facility: CLINIC | Age: 31
End: 2021-08-23

## 2021-08-23 DIAGNOSIS — R19.5 ABNORMAL FINDINGS IN STOOL: Primary | ICD-10-CM

## 2021-08-23 NOTE — TELEPHONE ENCOUNTER
"31 y.o     Went to the bathroom this morning. Did not strain to have a stool and had two \"worm like\" pieces fell into toilet from anus. Was not able to obtain those \"worm like\" pieces however did obtain two from stool.   No foul order to stool, no rectal pain, bleeding, itching or other rectal symptoms.   +bloating however just had period.   No abdominal pain, nausea or vomiting.   No recent travel.  No recent changes in diet or lifestyle.     Previously tested  Ova and parasites:10/2018    Patient is wanting to drop off specimens for a lab only appt to have evaluated since she isconcerned she has worms.      Routing to  to review and advise.  Bertha Woods RN    "

## 2021-08-23 NOTE — TELEPHONE ENCOUNTER
Future order placed. Patient informed of lab procedures for collecting sample. Can  stool container at any   [place] location.     Verbalized understanding and had no further questions.      Bertha Woods RN

## 2021-10-02 ENCOUNTER — HEALTH MAINTENANCE LETTER (OUTPATIENT)
Age: 31
End: 2021-10-02

## 2021-10-04 NOTE — PROGRESS NOTES
Elizabeth is a 31 year old  female who presents for annual exam.     Besides routine health maintenance, she would like to discuss possible yeast infeciton.    HPI:  The patient's PCP is MERARY Rosas CNP.  Doing well. Taking OCPs. Pippaud's is well controlled with nifedipine PRN. They got a new puppy who is doing well. They have had her for a month and a half. She had vulvar itching and used monistat 7 externally and felt ok but got triggered again with intercourse. She is not having any bothersome discharge. She is interested in getting the flu vaccine today.     GYNECOLOGIC HISTORY:    Patient's last menstrual period was 10/14/2021.    Regular menses? yes  Menses every 28 days.  Length of menses: 7 days    Her current contraception method is: oral contraceptives.  She  reports that she has never smoked. She has never used smokeless tobacco.    Patient is sexually active.  STD testing offered?  Declined     Last PHQ-9 score on record =   PHQ-9 SCORE 2021   PHQ-9 Total Score 0     Last GAD7 score on record =   DALILA-7 SCORE 2021   Total Score 6     Alcohol Score = 3    HEALTH MAINTENANCE:  Cholesterol: None found  Last Mammo: Not applicable, Result: Not applicable, Next Mammo: Due at age 42   Pap:   Lab Results   Component Value Date    PAP NIL 10/27/2020    PAP NIL 2017     Colonoscopy:  NA, Result: Not applicable, Next Colonoscopy: Due age 47   Dexa:  NA    Health maintenance updated:  yes    HISTORY:  OB History    Para Term  AB Living   0 0 0 0 0 0   SAB TAB Ectopic Multiple Live Births   0 0 0 0 0       Patient Active Problem List   Diagnosis     Acne     POD (perioral dermatitis)     History reviewed. No pertinent surgical history.   Social History     Tobacco Use     Smoking status: Never Smoker     Smokeless tobacco: Never Used   Substance Use Topics     Alcohol use: Yes     Comment: Occasionally      Problem (# of Occurrences) Relation (Name,Age of Onset)     "Alzheimer Disease (1) Maternal Grandmother    Breast Cancer (1) Mother    Thyroid Disease (1) Mother       Negative family history of: Cancer            Current Outpatient Medications   Medication Sig     Ascorbic Acid (NETTIE-C PO)      ferrous sulfate (IRON) 325 (65 FE) MG tablet Take 1 tablet (325 mg) by mouth 3 times daily (with meals)     fluconazole (DIFLUCAN) 150 MG tablet Take 1 tablet (150 mg) by mouth every 72 hours for 2 doses     multivitamin w/minerals (MULTI-VITAMIN) tablet Take 1 tablet by mouth daily     NIFEdipine ER (ADALAT CC) 30 MG 24 hr tablet Take 1 tablet (30 mg) by mouth daily     norgestimate-ethinyl estradiol (ORTHO-CYCLEN) 0.25-35 MG-MCG tablet Take 1 tablet by mouth daily     No current facility-administered medications for this visit.     No Known Allergies    Past medical, surgical, social and family histories were reviewed and updated in EPIC.    ROS:   12 point review of systems negative other than symptoms noted below or in the HPI.  Genitourinary: Vaginal Itching  No urinary frequency or dysuria, bladder or kidney problems    EXAM:  /62   Ht 1.765 m (5' 9.5\")   Wt 59.4 kg (131 lb)   LMP 10/14/2021   BMI 19.07 kg/m     BMI: Body mass index is 19.07 kg/m .    PHYSICAL EXAM:  Constitutional:   Appearance: Well nourished, well developed, alert, in no acute distress  Neck:  Lymph Nodes:  No lymphadenopathy present    Thyroid:  Gland size normal, nontender, no nodules or masses present on palpation  Chest:  Respiratory Effort:  Breathing unlabored, CTAB  Cardiovascular:    Heart: Auscultation:  Regular rate, normal rhythm, no murmurs present  Breasts: Inspection of Breasts:  No lymphadenopathy present., Palpation of Breasts and Axillae:  No masses present on palpation, no breast tenderness., Axillary Lymph Nodes:  No lymphadenopathy present. and No nodularity, asymmetry or nipple discharge bilaterally.  Gastrointestinal:   Abdominal Examination:  Abdomen nontender to palpation, " tone normal without rigidity or guarding, no masses present, umbilicus without lesions   Liver and Spleen:  No hepatomegaly present, liver nontender to palpation    Hernias:  No hernias present  Lymphatic: Lymph Nodes:  No other lymphadenopathy present  Skin:  General Inspection:  No rashes present, no lesions present, no areas of  discoloration  Neurologic:    Mental Status:  Oriented X3.  Normal strength and tone, sensory exam                grossly normal, mentation intact and speech normal.    Psychiatric:   Mentation appears normal and affect normal/bright.         Pelvic Exam:  External Genitalia:     Normal appearance for age, no discharge present, no tenderness present, no inflammatory lesions present, color normal  Vagina:     Normal vaginal vault without central or paravaginal defects, small amount of caseous discharge present in the vault, no inflammatory lesions present, no masses present  Bladder:     Nontender to palpation  Urethra:   Urethral Body:  Urethra palpation normal, urethra structural support normal   Urethral Meatus:  No erythema or lesions present  Cervix:     Appearance healthy, no lesions present, nontender to palpation, no bleeding present  Uterus:     Uterus: firm, normal sized and nontender, anteverted in position.   Adnexa:     No adnexal tenderness present, no adnexal masses present  Perineum:     Perineum within normal limits, no evidence of trauma, no rashes or skin lesions present    Genitalia and Groin:     No rashes present, no lesions present, no areas of discoloration, no masses present      COUNSELING:   Reviewed preventive health counseling, as reflected in patient instructions    BMI: Body mass index is 19.07 kg/m .      ASSESSMENT:  31 year old female with satisfactory annual exam.    ICD-10-CM    1. Encounter for gynecological examination without abnormal finding  Z01.419    2. Raynaud's disease without gangrene  I73.00 NIFEdipine ER (ADALAT CC) 30 MG 24 hr tablet   3.  Breakthrough bleeding on OCPs  N92.1 norgestimate-ethinyl estradiol (ORTHO-CYCLEN) 0.25-35 MG-MCG tablet   4. Candidiasis of vagina  B37.3 fluconazole (DIFLUCAN) 150 MG tablet   5. Iron deficiency anemia due to chronic blood loss  D50.0 Ferritin     Hemoglobin   6. Vulvar itching  L29.2 Bacterial Vaginosis Smear       PLAN:  Residual vaginitis is still present so will treat that today with oral diflucan.  Recommend flu vaccine today as well.  Ok to continue OCPs   Ok to continue Nifedipine  Recheck ferritin and hemoglobin due to history of anemia.    Rose Marie Montiel MD

## 2021-10-18 DIAGNOSIS — N92.1 BREAKTHROUGH BLEEDING ON OCPS: ICD-10-CM

## 2021-10-18 RX ORDER — NORGESTIMATE AND ETHINYL ESTRADIOL 0.25-0.035
1 KIT ORAL DAILY
Qty: 84 TABLET | Refills: 0 | Status: SHIPPED | OUTPATIENT
Start: 2021-10-18 | End: 2021-11-01

## 2021-10-18 NOTE — TELEPHONE ENCOUNTER
"Requested Prescriptions   Pending Prescriptions Disp Refills     norgestimate-ethinyl estradiol (ORTHO-CYCLEN) 0.25-35 MG-MCG tablet 84 tablet 0     Sig: Take 1 tablet by mouth daily       Contraceptives Protocol Passed - 10/18/2021 11:04 AM        Passed - Patient is not a current smoker if age is 35 or older        Passed - Recent (12 mo) or future (30 days) visit within the authorizing provider's specialty     Patient has had an office visit with the authorizing provider or a provider within the authorizing providers department within the previous 12 mos or has a future within next 30 days. See \"Patient Info\" tab in inbasket, or \"Choose Columns\" in Meds & Orders section of the refill encounter.              Passed - Medication is active on med list        Passed - No active pregnancy on record        Passed - No positive pregnancy test in past 12 months           Last Written Prescription Date:  7/26/21  Last Fill Quantity: 84,  # refills: 0   Last office visit: 10/27/2020 with prescribing provider:  10/27/2020 Annual   Future Office Visit:   Next 5 appointments (look out 90 days)    Nov 01, 2021  2:30 PM  PHYSICAL with Rose Marie Montiel MD  [facility] ([facility] ) [address] [place] [id number] [phone]                 "

## 2021-10-18 NOTE — TELEPHONE ENCOUNTER
Prescription approved per [facility] Refill Protocol.  Apoorva Lawson RN on 10/18/2021 at 1:38 PM

## 2021-10-21 ENCOUNTER — NURSE TRIAGE (OUTPATIENT)
Dept: NURSING | Facility: CLINIC | Age: 31
End: 2021-10-21

## 2021-10-22 NOTE — TELEPHONE ENCOUNTER
Spoke with pt about sx's - all external vulva, itchy but manageable  Did a tub soak last evening.  Just completed menses and uses a menstrual cup - feels maybe from that.  Recommended OTC Monistat 7 day and use the external itch cream on area along with daily tub soaks.  If no improvement or worsening of sx's: urgent care or call Monday to see if we have apt for office eval.    Pt verbalized understanding, in agreement with plan, and voiced no further questions.  Apoorva Lawson RN on 10/22/2021 at 8:21 AM

## 2021-10-22 NOTE — TELEPHONE ENCOUNTER
"Pt reports \"itchy rash on vulva, noticed two days ago\". No abnormal discharge. Pt denies fever, urination problems or unusual discharge. Pt reports \"pretty itchy\", when wiping after urination but otherwise mild. Pt states rash is \"little bumps and feels hard\".     Advised pt to try sitz bath several times daily and see provider tomorrow. Call back if fever or any severe symptoms arise.    Pt verbalizes understanding and agrees to plan.     Reason for Disposition    Itching or rash of external female genital area (vulva)    [1] Rash (e.g., redness, tiny bumps, sore) of genital area AND [2] present > 24 hours    Additional Information    Negative: Followed a genital area injury    Negative: Symptoms could be from sexual assault    Negative: Pain or burning with passing urine (urination) is main symptom    Negative: Vaginal discharge is main symptom    Negative: Pubic lice suspected    Negative: Pregnant    Negative: Patient sounds very sick or weak to the triager    Negative: [1] SEVERE pain AND [2] not improved 2 hours after pain medicine    Negative: [1] Genital area looks infected (e.g., draining sore, spreading redness) AND [2] fever    Protocols used: VAGINAL SYMPTOMS-A-AH, VULVAR SYMPTOMS-A-AH      "

## 2021-11-01 ENCOUNTER — OFFICE VISIT (OUTPATIENT)
Dept: OBGYN | Facility: CLINIC | Age: 31
End: 2021-11-01
Payer: COMMERCIAL

## 2021-11-01 VITALS
HEIGHT: 70 IN | BODY MASS INDEX: 18.75 KG/M2 | WEIGHT: 131 LBS | SYSTOLIC BLOOD PRESSURE: 108 MMHG | DIASTOLIC BLOOD PRESSURE: 62 MMHG

## 2021-11-01 DIAGNOSIS — I73.00 RAYNAUD'S DISEASE WITHOUT GANGRENE: ICD-10-CM

## 2021-11-01 DIAGNOSIS — Z01.419 ENCOUNTER FOR GYNECOLOGICAL EXAMINATION WITHOUT ABNORMAL FINDING: Primary | ICD-10-CM

## 2021-11-01 DIAGNOSIS — L29.2 VULVAR ITCHING: ICD-10-CM

## 2021-11-01 DIAGNOSIS — D50.0 IRON DEFICIENCY ANEMIA DUE TO CHRONIC BLOOD LOSS: ICD-10-CM

## 2021-11-01 DIAGNOSIS — Z23 NEED FOR PROPHYLACTIC VACCINATION AND INOCULATION AGAINST INFLUENZA: ICD-10-CM

## 2021-11-01 DIAGNOSIS — N92.1 BREAKTHROUGH BLEEDING ON OCPS: ICD-10-CM

## 2021-11-01 DIAGNOSIS — B37.31 CANDIDIASIS OF VAGINA: ICD-10-CM

## 2021-11-01 LAB — HGB BLD-MCNC: 12.3 G/DL (ref 11.7–15.7)

## 2021-11-01 PROCEDURE — 87205 SMEAR GRAM STAIN: CPT | Performed by: OBSTETRICS & GYNECOLOGY

## 2021-11-01 PROCEDURE — 82728 ASSAY OF FERRITIN: CPT | Performed by: OBSTETRICS & GYNECOLOGY

## 2021-11-01 PROCEDURE — 99395 PREV VISIT EST AGE 18-39: CPT | Mod: 25 | Performed by: OBSTETRICS & GYNECOLOGY

## 2021-11-01 PROCEDURE — 90471 IMMUNIZATION ADMIN: CPT | Performed by: OBSTETRICS & GYNECOLOGY

## 2021-11-01 PROCEDURE — 36415 COLL VENOUS BLD VENIPUNCTURE: CPT | Performed by: OBSTETRICS & GYNECOLOGY

## 2021-11-01 PROCEDURE — 90686 IIV4 VACC NO PRSV 0.5 ML IM: CPT | Performed by: OBSTETRICS & GYNECOLOGY

## 2021-11-01 PROCEDURE — 99213 OFFICE O/P EST LOW 20 MIN: CPT | Mod: 25 | Performed by: OBSTETRICS & GYNECOLOGY

## 2021-11-01 PROCEDURE — 85018 HEMOGLOBIN: CPT | Performed by: OBSTETRICS & GYNECOLOGY

## 2021-11-01 RX ORDER — MULTIPLE VITAMINS W/ MINERALS TAB 9MG-400MCG
1 TAB ORAL DAILY
COMMUNITY

## 2021-11-01 RX ORDER — NORGESTIMATE AND ETHINYL ESTRADIOL 0.25-0.035
1 KIT ORAL DAILY
Qty: 84 TABLET | Refills: 4 | Status: SHIPPED | OUTPATIENT
Start: 2021-11-01 | End: 2022-11-03

## 2021-11-01 RX ORDER — FLUCONAZOLE 150 MG/1
150 TABLET ORAL
Qty: 2 TABLET | Refills: 0 | Status: SHIPPED | OUTPATIENT
Start: 2021-11-01 | End: 2021-11-03

## 2021-11-01 RX ORDER — NIFEDIPINE 30 MG
30 TABLET, EXTENDED RELEASE ORAL DAILY
Qty: 90 TABLET | Refills: 2 | Status: SHIPPED | OUTPATIENT
Start: 2021-11-01 | End: 2022-08-03

## 2021-11-01 ASSESSMENT — ANXIETY QUESTIONNAIRES
GAD7 TOTAL SCORE: 6
6. BECOMING EASILY ANNOYED OR IRRITABLE: NOT AT ALL
5. BEING SO RESTLESS THAT IT IS HARD TO SIT STILL: SEVERAL DAYS
3. WORRYING TOO MUCH ABOUT DIFFERENT THINGS: SEVERAL DAYS
7. FEELING AFRAID AS IF SOMETHING AWFUL MIGHT HAPPEN: NOT AT ALL
2. NOT BEING ABLE TO STOP OR CONTROL WORRYING: SEVERAL DAYS
1. FEELING NERVOUS, ANXIOUS, OR ON EDGE: MORE THAN HALF THE DAYS
IF YOU CHECKED OFF ANY PROBLEMS ON THIS QUESTIONNAIRE, HOW DIFFICULT HAVE THESE PROBLEMS MADE IT FOR YOU TO DO YOUR WORK, TAKE CARE OF THINGS AT HOME, OR GET ALONG WITH OTHER PEOPLE: NOT DIFFICULT AT ALL

## 2021-11-01 ASSESSMENT — PATIENT HEALTH QUESTIONNAIRE - PHQ9
SUM OF ALL RESPONSES TO PHQ QUESTIONS 1-9: 0
5. POOR APPETITE OR OVEREATING: SEVERAL DAYS

## 2021-11-01 ASSESSMENT — MIFFLIN-ST. JEOR: SCORE: 1391.52

## 2021-11-02 LAB
BACTERIAL VAGINOSIS SMEAR: NORMAL
NUGENT SCORE: 1
WHITE BLOOD CELLS: NORMAL

## 2021-11-02 ASSESSMENT — ANXIETY QUESTIONNAIRES: GAD7 TOTAL SCORE: 6

## 2021-11-03 ENCOUNTER — TELEPHONE (OUTPATIENT)
Dept: OBGYN | Facility: CLINIC | Age: 31
End: 2021-11-03

## 2021-11-03 DIAGNOSIS — B37.31 CANDIDIASIS OF VAGINA: ICD-10-CM

## 2021-11-03 LAB — FERRITIN SERPL-MCNC: 11 NG/ML (ref 12–150)

## 2021-11-03 RX ORDER — FLUCONAZOLE 150 MG/1
150 TABLET ORAL
Qty: 2 TABLET | Refills: 0 | Status: SHIPPED | OUTPATIENT
Start: 2021-11-03 | End: 2023-11-09

## 2021-11-03 NOTE — TELEPHONE ENCOUNTER
[facility] called regarding this patients medication for fluconazole (DIFLUCAN) 150 MG tablet. They do not have it and were wondering if it could be sent to:  [facility]  [address] [place] [address]  Phone [phone]

## 2021-11-12 ENCOUNTER — MYC MEDICAL ADVICE (OUTPATIENT)
Dept: OBGYN | Facility: CLINIC | Age: 31
End: 2021-11-12
Payer: COMMERCIAL

## 2021-11-12 DIAGNOSIS — D50.0 IRON DEFICIENCY ANEMIA DUE TO CHRONIC BLOOD LOSS: Primary | ICD-10-CM

## 2021-11-12 NOTE — TELEPHONE ENCOUNTER
11/1/21 Lab result note      Hi Elizabeth  Would you consider seeing a Gastroenterologist for your persistently low iron levels. Given that you are on oral pills and not bleeding heavily the other two possibilities are inadequate iron intake or losing blood from another source. If you would like a referral please let me know.  Best  Dr Montiel      Routing pt [employer] message to provider to advise.  Winnie Huerta RN on 11/12/2021 at 2:05 PM

## 2021-11-15 NOTE — TELEPHONE ENCOUNTER
Routing pt ViFlux[employer] message to provider to advise.  Referral and orders pended  Winnie Huerta RN on 11/15/2021 at 9:59 AM

## 2022-01-30 ASSESSMENT — ENCOUNTER SYMPTOMS
SKIN CHANGES: 0
NAIL CHANGES: 0
POOR WOUND HEALING: 0

## 2022-02-02 ENCOUNTER — VIRTUAL VISIT (OUTPATIENT)
Dept: GASTROENTEROLOGY | Facility: CLINIC | Age: 32
End: 2022-02-02
Attending: OBSTETRICS & GYNECOLOGY
Payer: COMMERCIAL

## 2022-02-02 DIAGNOSIS — E61.1 IRON DEFICIENCY: Primary | ICD-10-CM

## 2022-02-02 PROCEDURE — 99205 OFFICE O/P NEW HI 60 MIN: CPT | Mod: 95 | Performed by: PHYSICIAN ASSISTANT

## 2022-02-02 NOTE — NURSING NOTE
Elizabeth is a 31 year old who is being evaluated via a billable video visit.      How would you like to obtain your AVS? MyChart  If the video visit is dropped, the invitation should be resent by: Send to e-mail at: [email or web address]  Will anyone else be joining your video visit? No

## 2022-02-02 NOTE — PATIENT INSTRUCTIONS
It was a pleasure visiting with you today.     Please make a lab appointment for blood work and to collect stool kits. You can contact your local [facility] or call [phone] to schedule at any convenient [facility] location. We will either call you or send you a My Chart message with your results.     Return in about 4 months (around 6/2/2022) for Follow up, using a video visit.    Aggie Monk PA-C  Gastroenterology  [facility]

## 2022-02-02 NOTE — PROGRESS NOTES
GASTROENTEROLOGY NEW PATIENT VIDEO VISIT     Video Start Time: 10:59 AM    CC/REFERRING MD:    Luciana Becker     REASON FOR CONSULTATION:   Referred by Rose Marie Montiel for Video Visit      HISTORY OF PRESENT ILLNESS:    Elizabeth Malhotra is 31 year old female who was referred by her OB who presents for evaluation of iron deficiency    She was initially found to have iron deficiency several years ago. She reports that she had returned from living in [facility] at that time. She did have several stomach bug infections in [facility]. Review of records show that her hemoglobin was down to 8.3 in 2017. Her hemoglobins on file since then have been within the normal range.     OB is concerned since her ferritin stores have remained low without exact cause. Her last ferritin was 3 months ago and was low. Patient however notes that she has not been consistent with iron supplementation. She does have a menstrual cycle monthly. Cycles are regular and usually last about 7 days. She has some clotting but is not soaking through pads. She has about 2-3 heavier days followed by light days. She is also on birth control. She has seen [facility] spotting in between cycles but does not have intermittent bleeding.     She has been on iron supplement daily with vitamin C for the past 3 months consistently.she does not have any signs of GI bleeding. No rectal bleeding or melena. No hematemesis. She has some nausea which occurs about once every 2 months or so without exact cause. Doesn't seem to be related to diet. Not related to menstrual cycles. No new medications around that time. She does not have reflux. No dysphagia. She does not have diarrhea or constipation. No abdominal pain. No abdominal bloating.     She denies regular NSAID use. She takes this infrequently about once every 2 months or so for a headache or menstrual cramp. But denies regular or frequent use.     She believes that her symptoms  with iron deficiency developed after having stomach bugs while living in [facility] several years ago. She has since tested negative for parasites but is still concerned.     There is no pertinent family hx.       Elizabeth  has a past medical history of Acne and Raynaud's phenomenon.    She  has no past surgical history on file.    She  reports that she has never smoked. She has never used smokeless tobacco. She reports current alcohol use. She reports that she does not use drugs.    Her family history includes Alzheimer Disease in her maternal grandmother; Breast Cancer in her mother; Thyroid Disease in her mother.    ALLERGIES:  Patient has no known allergies.        PERTINENT MEDICATIONS:    Current Outpatient Medications:      Ascorbic Acid (NETTIE-C PO), , Disp: , Rfl:      ferrous sulfate (IRON) 325 (65 FE) MG tablet, Take 1 tablet (325 mg) by mouth 3 times daily (with meals), Disp: 90 tablet, Rfl: 2     multivitamin w/minerals (MULTI-VITAMIN) tablet, Take 1 tablet by mouth daily, Disp: , Rfl:      NIFEdipine ER (ADALAT CC) 30 MG 24 hr tablet, Take 1 tablet (30 mg) by mouth daily, Disp: 90 tablet, Rfl: 2     norgestimate-ethinyl estradiol (ORTHO-CYCLEN) 0.25-35 MG-MCG tablet, Take 1 tablet by mouth daily, Disp: 84 tablet, Rfl: 4     fluconazole (DIFLUCAN) 150 MG tablet, Take 1 tablet (150 mg) by mouth every 72 hours (Patient not taking: Reported on 2/2/2022), Disp: 2 tablet, Rfl: 0        PHYSICAL EXAMINATION:  Constitutional: aaox3, cooperative, pleasant, not dyspneic/diaphoretic, no acute distress      GENERAL: Healthy, alert and no distress  EYES: Eyes grossly normal to inspection.  No discharge or erythema, or obvious scleral/conjunctival abnormalities.  RESP: No audible wheeze, cough, or visible cyanosis.  No visible retractions or increased work of breathing.    SKIN: Visible skin clear. No significant rash, abnormal pigmentation or lesions.  NEURO: Cranial nerves grossly intact.  Mentation and speech  appropriate for age.  PSYCH: Mentation appears normal, affect normal/bright, judgement and insight intact, normal speech and appearance well-groomed.      PERTINENT STUDIES: (I personally reviewed these laboratory studies today)  Most recent CBC:   Recent Labs   Lab Test 11/01/21  1520 03/02/20  1551 09/24/18  1601 11/07/17  0951   WBC  --   --  9.8 9.5   HGB 12.3 12.5 12.7 8.3*   HCT  --   --  38.5 28.6*   PLT  --   --  243 347       TSH   Date Value Ref Range Status   11/07/2017 1.68 0.40 - 4.00 mU/L Final         ASSESSMENT/PLAN:    1. Iron deficiency  - Ferritin; Future  - CBC with Platelets & Differential; Future  - Iron & Iron Binding Capacity; Future  - Tissue transglutaminase olga IgA and IgG; Future  - IgA; Future  - Cryptosporidium and Giardia antigens; Future  - Routine parasitology exam; Future  - Calprotectin Feces; Future  - CRP inflammation; Future  - Erythrocyte sedimentation rate auto; Future        Elizabeth Malhotra is a 31 year old female who presents for evaluation of iron deficiency.     This was initially found several years ago after returning from [facility]. She was anemic at that time with a hemoglobin down to 8.3. her hemoglobin on file since then have been within the normal range. Her ferritin however has consistency remained on the lower end. Her OB is concerned regarding low ferritin despite oral iron however she admits that she has not been consistent with oral iron supplementation in the past. She has however been consistent with iron supplement in the last 3 months. We reviewed concerns with iron def from a GI standpoint which is typically GI blood loss vs malabsorption. She does not have any signs of GI bleeding. And she also does not have any symptoms that give concern for malabsorption.    We discussed upper endoscopy, colonoscopy and video capsule endoscopy as evaluation for iron def anemia. She actually has not had anemia for a few years but does have iron def. Advised that it is  reasonable for us to pursue an egd/colonsocopy +/- video capsule to r/o concerns for GI bleeding however also reasonable to first see if she has responded appropriately to iron supplementation since taking consistently recently.     Also recommended checking labs and stool studies. Labs to include cbc, ferritin and iron panel. Will also further evaluate with labs to include celiac screening as cause for iron deficiency. Will also check serum and stool inflammatory markers. Given her concerns will also check stool studies for O&P and giardia/cryptosporidium. Again, would be reassuring if ferritin has improved since she has been consistently taking iron in the last 3 months and was not doing this prior.       Further recommendation after labs.       Return in about 4 months (around 6/2/2022) for Follow up, using a video visit.      Thank you for this consultation.  It was a pleasure to participate in the care of this patient; please contact us with any further questions.        This note was created with voice recognition software, and while reviewed for accuracy, typos may remain.     60 minutes spent on the date of the encounter doing chart review, history and exam, documentation and further activities per the note    Aggie Monk PA-C  Gastroenterology  [facility]       Video-Visit Details    Type of service:  Video Visit    Video End Time:11:38 AM    Originating Location (pt. Location): Home    Distant Location (provider location):  [facility]     Platform used for Video Visit: Komal

## 2022-02-03 ENCOUNTER — LAB (OUTPATIENT)
Dept: LAB | Facility: CLINIC | Age: 32
End: 2022-02-03
Payer: COMMERCIAL

## 2022-02-03 DIAGNOSIS — E61.1 IRON DEFICIENCY: ICD-10-CM

## 2022-02-03 LAB
BASOPHILS # BLD AUTO: 0 10E3/UL (ref 0–0.2)
BASOPHILS NFR BLD AUTO: 1 %
CRP SERPL-MCNC: <2.9 MG/L (ref 0–8)
EOSINOPHIL # BLD AUTO: 0.2 10E3/UL (ref 0–0.7)
EOSINOPHIL NFR BLD AUTO: 3 %
ERYTHROCYTE [DISTWIDTH] IN BLOOD BY AUTOMATED COUNT: 12.6 % (ref 10–15)
ERYTHROCYTE [SEDIMENTATION RATE] IN BLOOD BY WESTERGREN METHOD: 7 MM/HR (ref 0–20)
HCT VFR BLD AUTO: 41.5 % (ref 35–47)
HGB BLD-MCNC: 13.6 G/DL (ref 11.7–15.7)
LYMPHOCYTES # BLD AUTO: 2.5 10E3/UL (ref 0.8–5.3)
LYMPHOCYTES NFR BLD AUTO: 31 %
MCH RBC QN AUTO: 30.2 PG (ref 26.5–33)
MCHC RBC AUTO-ENTMCNC: 32.8 G/DL (ref 31.5–36.5)
MCV RBC AUTO: 92 FL (ref 78–100)
MONOCYTES # BLD AUTO: 0.6 10E3/UL (ref 0–1.3)
MONOCYTES NFR BLD AUTO: 7 %
NEUTROPHILS # BLD AUTO: 4.8 10E3/UL (ref 1.6–8.3)
NEUTROPHILS NFR BLD AUTO: 59 %
PLATELET # BLD AUTO: 276 10E3/UL (ref 150–450)
RBC # BLD AUTO: 4.5 10E6/UL (ref 3.8–5.2)
WBC # BLD AUTO: 8.2 10E3/UL (ref 4–11)

## 2022-02-03 PROCEDURE — 36415 COLL VENOUS BLD VENIPUNCTURE: CPT

## 2022-02-03 PROCEDURE — 86140 C-REACTIVE PROTEIN: CPT

## 2022-02-03 PROCEDURE — 82728 ASSAY OF FERRITIN: CPT

## 2022-02-03 PROCEDURE — 82784 ASSAY IGA/IGD/IGG/IGM EACH: CPT

## 2022-02-03 PROCEDURE — 85652 RBC SED RATE AUTOMATED: CPT

## 2022-02-03 PROCEDURE — 83550 IRON BINDING TEST: CPT

## 2022-02-03 PROCEDURE — 86364 TISS TRNSGLTMNASE EA IG CLAS: CPT

## 2022-02-03 PROCEDURE — 85025 COMPLETE CBC W/AUTO DIFF WBC: CPT

## 2022-02-04 ENCOUNTER — APPOINTMENT (OUTPATIENT)
Dept: LAB | Facility: CLINIC | Age: 32
End: 2022-02-04
Payer: COMMERCIAL

## 2022-02-04 LAB
FERRITIN SERPL-MCNC: 23 NG/ML (ref 12–150)
IGA SERPL-MCNC: 170 MG/DL (ref 84–499)
IRON SATN MFR SERPL: 26 % (ref 15–46)
IRON SERPL-MCNC: 101 UG/DL (ref 35–180)
TIBC SERPL-MCNC: 389 UG/DL (ref 240–430)
TTG IGA SER-ACNC: 0.5 U/ML
TTG IGG SER-ACNC: 0.7 U/ML

## 2022-02-04 PROCEDURE — 87328 CRYPTOSPORIDIUM AG IA: CPT

## 2022-02-04 PROCEDURE — 87177 OVA AND PARASITES SMEARS: CPT

## 2022-02-04 PROCEDURE — 83993 ASSAY FOR CALPROTECTIN FECAL: CPT

## 2022-02-04 PROCEDURE — 87329 GIARDIA AG IA: CPT

## 2022-02-04 PROCEDURE — 87209 SMEAR COMPLEX STAIN: CPT

## 2022-02-07 LAB
C PARVUM AG STL QL IA: NEGATIVE
CALPROTECTIN STL-MCNT: <5 MG/KG (ref 0–49.9)
G LAMBLIA AG STL QL IA: NEGATIVE
O+P STL MICRO: NEGATIVE

## 2022-08-03 DIAGNOSIS — I73.00 RAYNAUD'S DISEASE WITHOUT GANGRENE: ICD-10-CM

## 2022-08-03 RX ORDER — NIFEDIPINE 30 MG
30 TABLET, EXTENDED RELEASE ORAL DAILY
Qty: 90 TABLET | Refills: 0 | Status: SHIPPED | OUTPATIENT
Start: 2022-08-03 | End: 2022-11-01

## 2022-08-03 NOTE — TELEPHONE ENCOUNTER
"Requested Prescriptions   Pending Prescriptions Disp Refills     NIFEdipine ER (ADALAT CC) 30 MG 24 hr tablet 90 tablet 2     Sig: Take 1 tablet (30 mg) by mouth daily       Calcium Channel Blockers Protocol  Failed - 8/3/2022 10:35 AM        Failed - Normal serum creatinine on file in past 12 months     No lab results found.    Ok to refill medication if creatinine is low          Passed - Blood pressure under 140/90 in past 12 months     BP Readings from Last 3 Encounters:   11/01/21 108/62   10/27/20 110/68   03/02/20 112/70                 Passed - Recent (12 mo) or future (30 days) visit within the authorizing provider's specialty     Patient has had an office visit with the authorizing provider or a provider within the authorizing providers department within the previous 12 mos or has a future within next 30 days. See \"Patient Info\" tab in inbasket, or \"Choose Columns\" in Meds & Orders section of the refill encounter.              Passed - Medication is active on med list        Passed - Patient is age 18 or older        Passed - No active pregnancy on record        Passed - No positive pregnancy test in past 12 months           Last Written Prescription Date:  11/1/21  Last Fill Quantity: 90,  # refills: 2   Last office visit: 11/1/2021 with prescribing provider:  Rose Marie Montiel   Future Office Visit:        Prescription approved per [facility] Refill Protocol.    Bertha Woods RN      "

## 2022-09-03 ENCOUNTER — HEALTH MAINTENANCE LETTER (OUTPATIENT)
Age: 32
End: 2022-09-03

## 2022-10-27 DIAGNOSIS — I73.00 RAYNAUD'S DISEASE WITHOUT GANGRENE: ICD-10-CM

## 2022-10-27 RX ORDER — NIFEDIPINE 30 MG
30 TABLET, EXTENDED RELEASE ORAL DAILY
Qty: 90 TABLET | Refills: 0 | OUTPATIENT
Start: 2022-10-27

## 2022-10-27 NOTE — TELEPHONE ENCOUNTER
"Requested Prescriptions   Pending Prescriptions Disp Refills     NIFEdipine ER (ADALAT CC) 30 MG 24 hr tablet 90 tablet 0     Sig: Take 1 tablet (30 mg) by mouth daily       Calcium Channel Blockers Protocol  Failed - 10/27/2022  9:42 AM        Failed - Normal serum creatinine on file in past 12 months     No lab results found.    Ok to refill medication if creatinine is low          Passed - Blood pressure under 140/90 in past 12 months     BP Readings from Last 3 Encounters:   11/01/21 108/62   10/27/20 110/68   03/02/20 112/70                 Passed - Recent (12 mo) or future (30 days) visit within the authorizing provider's specialty     Patient has had an office visit with the authorizing provider or a provider within the authorizing providers department within the previous 12 mos or has a future within next 30 days. See \"Patient Info\" tab in inbasket, or \"Choose Columns\" in Meds & Orders section of the refill encounter.              Passed - Medication is active on med list        Passed - Patient is age 18 or older        Passed - No active pregnancy on record        Passed - No positive pregnancy test in past 12 months              Last Written Prescription Date: 8/3/2022  Last Fill Quantity: 90,  # refills: 0   Last office visit: 11/1/2021 with prescribing provider:  ROSE MARIE MONTIEL   Future Office Visit:   Next 5 appointments (look out 90 days)    Nov 03, 2022  8:30 AM  PHYSICAL with Rose Marie Montiel MD  [facility] ([facility] ) [address] [place] [id number] [phone]         Rx denied, labs needed  Appt scheduled  Gonzalo Rosario RN on 10/27/2022 at 9:01 PM        "

## 2022-11-01 DIAGNOSIS — I73.00 RAYNAUD'S DISEASE WITHOUT GANGRENE: ICD-10-CM

## 2022-11-01 RX ORDER — NIFEDIPINE 30 MG
30 TABLET, EXTENDED RELEASE ORAL DAILY
Qty: 90 TABLET | Refills: 0 | Status: SHIPPED | OUTPATIENT
Start: 2022-11-01 | End: 2022-11-03

## 2022-11-01 NOTE — TELEPHONE ENCOUNTER
"Requested Prescriptions   Pending Prescriptions Disp Refills     NIFEdipine ER (ADALAT CC) 30 MG 24 hr tablet 90 tablet 0     Sig: Take 1 tablet (30 mg) by mouth daily       Calcium Channel Blockers Protocol  Failed - 11/1/2022  1:14 PM        Failed - Normal serum creatinine on file in past 12 months     No lab results found.    Ok to refill medication if creatinine is low          Passed - Blood pressure under 140/90 in past 12 months     BP Readings from Last 3 Encounters:   11/01/21 108/62   10/27/20 110/68   03/02/20 112/70                 Passed - Recent (12 mo) or future (30 days) visit within the authorizing provider's specialty     Patient has had an office visit with the authorizing provider or a provider within the authorizing providers department within the previous 12 mos or has a future within next 30 days. See \"Patient Info\" tab in inbasket, or \"Choose Columns\" in Meds & Orders section of the refill encounter.              Passed - Medication is active on med list        Passed - Patient is age 18 or older        Passed - No active pregnancy on record        Passed - No positive pregnancy test in past 12 months           Last Written Prescription Date:  8/3/22  Last Fill Quantity: 90,  # refills: 0   Last office visit: 11/1/2021 with prescribing provider:  Rose Marie Montiel     Future Office Visit:   Next 5 appointments (look out 90 days)    Nov 03, 2022  8:30 AM  PHYSICAL with Rose Marie Montiel MD  [facility] ([facility] ) [address] [id number] [phone]                 "

## 2022-11-01 NOTE — TELEPHONE ENCOUNTER
Prescription approved per [facility] Refill Protocol.  Apoorva Lawson RN on 11/1/2022 at 2:27 PM

## 2022-11-01 NOTE — PROGRESS NOTES
Elizabeth is a 32 year old  female who presents for annual exam.     Besides routine health maintenance, she has no other health concerns today .    HPI:    Here for an annual. Not taking the oral iron as religiously. 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 She is doing well on her OCP without issues.     The patient's PCP is MERARY Rosas CNP.        GYNECOLOGIC HISTORY:    No LMP recorded.  Regular menses? yes  Menses every 28 days.  Length of menses: 7 days  Her current contraception method is: oral contraceptives.  She  reports that she has never smoked. She has never used smokeless tobacco.  Patient is sexually active.  STD testing offered?  Declined    Last PHQ-9 score on record =   PHQ-9 SCORE 11/3/2022   PHQ-9 Total Score 3     Last GAD7 score on record =   DALILA-7 SCORE 11/3/2022   Total Score 14     Alcohol Score = 3    HEALTH MAINTENANCE:  Cholesterol:   No results found  Last Mammo: N/A, Result: not applicable, Next Mammo: screen age 40  Pap:   Lab Results   Component Value Date    PAP NIL 10/27/2020    PAP NIL 2017    PAP NIL 2014   Colonoscopy:  N/A, Result: not applicable, Next Colonoscopy: screen age 45  Dexa:  N/A  Health maintenance updated:  Immunizations reviewed, flu vaccine today      HISTORY:  OB History    Para Term  AB Living   0 0 0 0 0 0   SAB IAB Ectopic Multiple Live Births   0 0 0 0 0       Patient Active Problem List   Diagnosis     Acne     POD (perioral dermatitis)     Raynauds phenomenon     History reviewed. No pertinent surgical history.   Social History     Tobacco Use     Smoking status: Never     Smokeless tobacco: Never   Substance Use Topics     Alcohol use: Yes     Comment: Occasionally      Problem (# of Occurrences) Relation (Name,Age of Onset)    Alzheimer Disease (1) Maternal Grandmother    Thyroid Disease (1) Mother    Breast Cancer (1) Mother  "      Negative family history of: Cancer            Current Outpatient Medications   Medication Sig     Ascorbic Acid (NETTIE-C PO)      ferrous sulfate (IRON) 325 (65 FE) MG tablet Take 1 tablet (325 mg) by mouth 3 times daily (with meals)     fluconazole (DIFLUCAN) 150 MG tablet Take 1 tablet (150 mg) by mouth every 72 hours     multivitamin w/minerals (THERA-VIT-M) tablet Take 1 tablet by mouth daily     NIFEdipine ER (ADALAT CC) 30 MG 24 hr tablet Take 1 tablet (30 mg) by mouth daily     norgestimate-ethinyl estradiol (ORTHO-CYCLEN) 0.25-35 MG-MCG tablet Take 1 tablet by mouth daily     No current facility-administered medications for this visit.     No Known Allergies    Past medical, surgical, social and family histories were reviewed and updated in EPIC.    ROS:   12 point review of systems negative other than symptoms noted below or in the HPI.  No urinary frequency or dysuria, bladder or kidney problems    EXAM:  /60   Ht 1.778 m (5' 10\")   Wt 59.7 kg (131 lb 9.6 oz)   BMI 18.88 kg/m     BMI: Body mass index is 18.88 kg/m .    PHYSICAL EXAM:  Constitutional:   Appearance: Well nourished, well developed, alert, in no acute distress  Neck:  Lymph Nodes:  No lymphadenopathy present    Thyroid:  Gland size normal, nontender, no nodules or masses present on palpation  Chest:  Respiratory Effort:  Breathing unlabored, CTAB  Cardiovascular:    Heart: Auscultation:  Regular rate, normal rhythm, no murmurs present  Breasts: Inspection of Breasts:  No lymphadenopathy present., Palpation of Breasts and Axillae:  No masses present on palpation, no breast tenderness., Axillary Lymph Nodes:  No lymphadenopathy present. and No nodularity, asymmetry or nipple discharge bilaterally.  Gastrointestinal:   Abdominal Examination:  Abdomen nontender to palpation, tone normal without rigidity or guarding, no masses present, umbilicus without lesions   Liver and Spleen:  No hepatomegaly present, liver nontender to " palpation    Hernias:  No hernias present  Lymphatic: Lymph Nodes:  No other lymphadenopathy present  Skin:  General Inspection:  No rashes present, no lesions present, no areas of  discoloration  Neurologic:    Mental Status:  Oriented X3.  Normal strength and tone, sensory exam                grossly normal, mentation intact and speech normal.    Psychiatric:   Mentation appears normal and affect normal/bright.         Pelvic Exam:  External Genitalia:     Normal appearance for age, no discharge present, no tenderness present, no inflammatory lesions present, color normal  Vagina:     Normal vaginal vault without central or paravaginal defects, no discharge present, no inflammatory lesions present, no masses present  Bladder:     Nontender to palpation  Urethra:   Urethral Body:  Urethra palpation normal, urethra structural support normal   Urethral Meatus:  No erythema or lesions present  Cervix:     Appearance healthy, no lesions present, nontender to palpation, no bleeding present  Uterus:     Uterus: firm, normal sized and nontender, retroverted in position.   Adnexa:     No adnexal tenderness present, no adnexal masses present  Perineum:     Perineum within normal limits, no evidence of trauma, no rashes or skin lesions present  Anus:     Anus within normal limits, no hemorrhoids present  Inguinal Lymph Nodes:     No lymphadenopathy present  Pubic Hair:     Normal pubic hair distribution for age  Genitalia and Groin:     No rashes present, no lesions present, no areas of discoloration, no masses present      COUNSELING:   Reviewed preventive health counseling, as reflected in patient instructions    BMI: Body mass index is 18.88 kg/m .      ASSESSMENT:  32 year old female with satisfactory annual exam.    ICD-10-CM    1. Encounter for gynecological examination without abnormal finding  Z01.419       2. Breakthrough bleeding on OCPs  N92.1 norgestimate-ethinyl estradiol (ORTHO-CYCLEN) 0.25-35 MG-MCG tablet       3. Raynaud's disease without gangrene  I73.00 NIFEdipine ER (ADALAT CC) 30 MG 24 hr tablet      4. High priority for 2019-nCoV vaccine  Z23       5. Need for prophylactic vaccination and inoculation against influenza  Z23 INFLUENZA VACCINE IM > 6 MONTHS VALENT IIV4 (AFLURIA/FLUZONE)      6. Screening, anemia, deficiency, iron  Z13.0 CBC with platelets     Ferritin     CBC with platelets     Ferritin          PLAN:  Normal breast and pelvic exam  Anxiety is likely situational and will likely improve when she changes jobs.  COVID booster today  Will screen for anemia today. Ferritin level was better in the Spring of this year.  OK to continue on OCPs  Discussed family planning  RTC in 1 year.     Rose Marie Montiel MD

## 2022-11-03 ENCOUNTER — OFFICE VISIT (OUTPATIENT)
Dept: OBGYN | Facility: CLINIC | Age: 32
End: 2022-11-03
Payer: COMMERCIAL

## 2022-11-03 VITALS
WEIGHT: 131.6 LBS | DIASTOLIC BLOOD PRESSURE: 60 MMHG | BODY MASS INDEX: 18.84 KG/M2 | HEIGHT: 70 IN | SYSTOLIC BLOOD PRESSURE: 118 MMHG

## 2022-11-03 DIAGNOSIS — Z23 HIGH PRIORITY FOR 2019-NCOV VACCINE: ICD-10-CM

## 2022-11-03 DIAGNOSIS — N92.1 BREAKTHROUGH BLEEDING ON OCPS: ICD-10-CM

## 2022-11-03 DIAGNOSIS — Z23 NEED FOR PROPHYLACTIC VACCINATION AND INOCULATION AGAINST INFLUENZA: ICD-10-CM

## 2022-11-03 DIAGNOSIS — I73.00 RAYNAUD'S DISEASE WITHOUT GANGRENE: ICD-10-CM

## 2022-11-03 DIAGNOSIS — Z13.0 SCREENING, ANEMIA, DEFICIENCY, IRON: ICD-10-CM

## 2022-11-03 DIAGNOSIS — Z13.220 LIPID SCREENING: ICD-10-CM

## 2022-11-03 DIAGNOSIS — Z01.419 ENCOUNTER FOR GYNECOLOGICAL EXAMINATION WITHOUT ABNORMAL FINDING: Primary | ICD-10-CM

## 2022-11-03 LAB
ERYTHROCYTE [DISTWIDTH] IN BLOOD BY AUTOMATED COUNT: 12.6 % (ref 10–15)
HCT VFR BLD AUTO: 39.7 % (ref 35–47)
HGB BLD-MCNC: 12.7 G/DL (ref 11.7–15.7)
MCH RBC QN AUTO: 30 PG (ref 26.5–33)
MCHC RBC AUTO-ENTMCNC: 32 G/DL (ref 31.5–36.5)
MCV RBC AUTO: 94 FL (ref 78–100)
PLATELET # BLD AUTO: 245 10E3/UL (ref 150–450)
RBC # BLD AUTO: 4.23 10E6/UL (ref 3.8–5.2)
WBC # BLD AUTO: 7.3 10E3/UL (ref 4–11)

## 2022-11-03 PROCEDURE — 90471 IMMUNIZATION ADMIN: CPT | Performed by: OBSTETRICS & GYNECOLOGY

## 2022-11-03 PROCEDURE — 36415 COLL VENOUS BLD VENIPUNCTURE: CPT | Performed by: OBSTETRICS & GYNECOLOGY

## 2022-11-03 PROCEDURE — 82728 ASSAY OF FERRITIN: CPT | Performed by: OBSTETRICS & GYNECOLOGY

## 2022-11-03 PROCEDURE — 90686 IIV4 VACC NO PRSV 0.5 ML IM: CPT | Performed by: OBSTETRICS & GYNECOLOGY

## 2022-11-03 PROCEDURE — 85027 COMPLETE CBC AUTOMATED: CPT | Performed by: OBSTETRICS & GYNECOLOGY

## 2022-11-03 PROCEDURE — 99395 PREV VISIT EST AGE 18-39: CPT | Mod: 25 | Performed by: OBSTETRICS & GYNECOLOGY

## 2022-11-03 RX ORDER — NIFEDIPINE 30 MG
30 TABLET, EXTENDED RELEASE ORAL DAILY
Qty: 90 TABLET | Refills: 3 | Status: SHIPPED | OUTPATIENT
Start: 2022-11-03 | End: 2023-01-03

## 2022-11-03 RX ORDER — NORGESTIMATE AND ETHINYL ESTRADIOL 0.25-0.035
1 KIT ORAL DAILY
Qty: 84 TABLET | Refills: 4 | Status: SHIPPED | OUTPATIENT
Start: 2022-11-03 | End: 2023-01-03

## 2022-11-03 ASSESSMENT — ANXIETY QUESTIONNAIRES
GAD7 TOTAL SCORE: 14
3. WORRYING TOO MUCH ABOUT DIFFERENT THINGS: MORE THAN HALF THE DAYS
1. FEELING NERVOUS, ANXIOUS, OR ON EDGE: MORE THAN HALF THE DAYS
IF YOU CHECKED OFF ANY PROBLEMS ON THIS QUESTIONNAIRE, HOW DIFFICULT HAVE THESE PROBLEMS MADE IT FOR YOU TO DO YOUR WORK, TAKE CARE OF THINGS AT HOME, OR GET ALONG WITH OTHER PEOPLE: SOMEWHAT DIFFICULT
GAD7 TOTAL SCORE: 14
5. BEING SO RESTLESS THAT IT IS HARD TO SIT STILL: MORE THAN HALF THE DAYS
2. NOT BEING ABLE TO STOP OR CONTROL WORRYING: MORE THAN HALF THE DAYS
6. BECOMING EASILY ANNOYED OR IRRITABLE: MORE THAN HALF THE DAYS
7. FEELING AFRAID AS IF SOMETHING AWFUL MIGHT HAPPEN: MORE THAN HALF THE DAYS

## 2022-11-03 ASSESSMENT — PATIENT HEALTH QUESTIONNAIRE - PHQ9
5. POOR APPETITE OR OVEREATING: MORE THAN HALF THE DAYS
SUM OF ALL RESPONSES TO PHQ QUESTIONS 1-9: 3

## 2022-11-04 LAB — FERRITIN SERPL-MCNC: 17 NG/ML (ref 12–150)

## 2022-11-17 ENCOUNTER — ALLIED HEALTH/NURSE VISIT (OUTPATIENT)
Dept: NURSING | Facility: CLINIC | Age: 32
End: 2022-11-17
Payer: COMMERCIAL

## 2022-11-17 DIAGNOSIS — Z13.220 LIPID SCREENING: ICD-10-CM

## 2022-11-17 DIAGNOSIS — Z23 HIGH PRIORITY FOR 2019-NCOV VACCINE: Primary | ICD-10-CM

## 2022-11-17 LAB
CHOLEST SERPL-MCNC: 223 MG/DL
HDLC SERPL-MCNC: 79 MG/DL
LDLC SERPL CALC-MCNC: 132 MG/DL
NONHDLC SERPL-MCNC: 144 MG/DL
TRIGL SERPL-MCNC: 62 MG/DL

## 2022-11-17 PROCEDURE — 36415 COLL VENOUS BLD VENIPUNCTURE: CPT

## 2022-11-17 PROCEDURE — 99207 PR NO CHARGE NURSE ONLY: CPT

## 2022-11-17 PROCEDURE — 80061 LIPID PANEL: CPT

## 2022-11-17 PROCEDURE — 0134A COVID-19,PF,MODERNA BIVALENT: CPT

## 2022-11-17 PROCEDURE — 91313 COVID-19,PF,MODERNA BIVALENT: CPT

## 2022-12-29 DIAGNOSIS — I73.00 RAYNAUD'S DISEASE WITHOUT GANGRENE: ICD-10-CM

## 2022-12-29 DIAGNOSIS — N92.1 BREAKTHROUGH BLEEDING ON OCPS: ICD-10-CM

## 2022-12-29 NOTE — TELEPHONE ENCOUNTER
"Requested Prescriptions   Pending Prescriptions Disp Refills     NIFEdipine ER (ADALAT CC) 30 MG 24 hr tablet 90 tablet 3     Sig: Take 1 tablet (30 mg) by mouth daily       Calcium Channel Blockers Protocol  Failed - 12/29/2022  2:17 PM        Failed - Normal serum creatinine on file in past 12 months     No lab results found.    Ok to refill medication if creatinine is low          Passed - Blood pressure under 140/90 in past 12 months     BP Readings from Last 3 Encounters:   11/03/22 118/60   11/01/21 108/62   10/27/20 110/68                 Passed - Recent (12 mo) or future (30 days) visit within the authorizing provider's specialty     Patient has had an office visit with the authorizing provider or a provider within the authorizing providers department within the previous 12 mos or has a future within next 30 days. See \"Patient Info\" tab in inbasket, or \"Choose Columns\" in Meds & Orders section of the refill encounter.              Passed - Medication is active on med list        Passed - Patient is age 18 or older        Passed - No active pregnancy on record        Passed - No positive pregnancy test in past 12 months             norgestimate-ethinyl estradiol (ORTHO-CYCLEN) 0.25-35 MG-MCG tablet 84 tablet 4     Sig: Take 1 tablet by mouth daily       Contraceptives Protocol Passed - 12/29/2022  2:17 PM        Passed - Patient is not a current smoker if age is 35 or older        Passed - Recent (12 mo) or future (30 days) visit within the authorizing provider's specialty     Patient has had an office visit with the authorizing provider or a provider within the authorizing providers department within the previous 12 mos or has a future within next 30 days. See \"Patient Info\" tab in inbasket, or \"Choose Columns\" in Meds & Orders section of the refill encounter.              Passed - Medication is active on med list        Passed - No active pregnancy on record        Passed - No positive pregnancy test in " past 12 months           NIFEdipine ER (ADALAT CC) 30 MG 24 hr tablet  Last Written Prescription Date:  11/03/22  Last Fill Quantity: 90,  # refills: 3     norgestimate-ethinyl estradiol (ORTHO-CYCLEN) 0.25-35 MG-MCG tablet  Last Written Prescription Date:  11/03/22  Last Fill Quantity: 84,  # refills: 4     Last office visit: 11/3/2022 with prescribing provider:  Tiana   Future Office Visit: none found

## 2022-12-29 NOTE — TELEPHONE ENCOUNTER
New mail order request for [facility] mail order pharmacy.    Left message for pt to call and confirm these are her wishes and we will send rx after we hear from her.    Apoorva Lawson RN on 12/29/2022 at 2:35 PM

## 2023-01-03 RX ORDER — NORGESTIMATE AND ETHINYL ESTRADIOL 0.25-0.035
1 KIT ORAL DAILY
Qty: 84 TABLET | Refills: 3 | Status: SHIPPED | OUTPATIENT
Start: 2023-01-03 | End: 2023-11-09

## 2023-01-03 RX ORDER — NIFEDIPINE 30 MG
30 TABLET, EXTENDED RELEASE ORAL DAILY
Qty: 90 TABLET | Refills: 2 | Status: SHIPPED | OUTPATIENT
Start: 2023-01-03 | End: 2023-11-09

## 2023-01-30 DIAGNOSIS — I73.00 RAYNAUD'S DISEASE WITHOUT GANGRENE: ICD-10-CM

## 2023-01-30 NOTE — TELEPHONE ENCOUNTER
"Requested Prescriptions   Pending Prescriptions Disp Refills     NIFEdipine ER (ADALAT CC) 30 MG 24 hr tablet 90 tablet 2     Sig: Take 1 tablet (30 mg) by mouth daily       Calcium Channel Blockers Protocol  Failed - 1/30/2023 11:15 AM        Failed - Normal serum creatinine on file in past 12 months     No lab results found.    Ok to refill medication if creatinine is low          Passed - Blood pressure under 140/90 in past 12 months     BP Readings from Last 3 Encounters:   11/03/22 118/60   11/01/21 108/62   10/27/20 110/68                 Passed - Recent (12 mo) or future (30 days) visit within the authorizing provider's specialty     Patient has had an office visit with the authorizing provider or a provider within the authorizing providers department within the previous 12 mos or has a future within next 30 days. See \"Patient Info\" tab in inbasket, or \"Choose Columns\" in Meds & Orders section of the refill encounter.              Passed - Medication is active on med list        Passed - Patient is age 18 or older        Passed - No active pregnancy on record        Passed - No positive pregnancy test in past 12 months             Last Written Prescription Date:  12/29/2022  Last Fill Quantity:90,  # refills :2  Last office visit: 11/3/2022 with prescribing provider:  Rose Marie Montiel MD  [facility]  Prudence Rice RN on 1/30/2023 at 4:04 PM        "

## 2023-02-01 RX ORDER — NIFEDIPINE 30 MG
30 TABLET, EXTENDED RELEASE ORAL DAILY
Qty: 90 TABLET | Refills: 2 | OUTPATIENT
Start: 2023-02-01

## 2023-02-10 DIAGNOSIS — I73.00 RAYNAUD'S DISEASE WITHOUT GANGRENE: ICD-10-CM

## 2023-02-10 NOTE — TELEPHONE ENCOUNTER
[facility] requesting refill - previously sent to [facility] mailorder    Called pt and left detailed vm asking confirmation she wants rx sent to [facility].    Apoorva Lawson RN on 2/10/2023 at 12:00 PM

## 2023-02-10 NOTE — TELEPHONE ENCOUNTER
"Requested Prescriptions   Pending Prescriptions Disp Refills     NIFEdipine ER (ADALAT CC) 30 MG 24 hr tablet 90 tablet 2     Sig: Take 1 tablet (30 mg) by mouth daily       Calcium Channel Blockers Protocol  Failed - 2/10/2023 11:36 AM        Failed - Normal serum creatinine on file in past 12 months     No lab results found.    Ok to refill medication if creatinine is low          Passed - Blood pressure under 140/90 in past 12 months     BP Readings from Last 3 Encounters:   11/03/22 118/60   11/01/21 108/62   10/27/20 110/68                 Passed - Recent (12 mo) or future (30 days) visit within the authorizing provider's specialty     Patient has had an office visit with the authorizing provider or a provider within the authorizing providers department within the previous 12 mos or has a future within next 30 days. See \"Patient Info\" tab in inbasket, or \"Choose Columns\" in Meds & Orders section of the refill encounter.              Passed - Medication is active on med list        Passed - Patient is age 18 or older        Passed - No active pregnancy on record        Passed - No positive pregnancy test in past 12 months           Last Written Prescription Date:  1/3/23  Last Fill Quantity: 90,  # refills: 2   Last office visit: 11/3/2022 with prescribing provider:  Rose Marie Montiel    Future Office Visit:            "

## 2023-02-13 RX ORDER — NIFEDIPINE 30 MG
30 TABLET, EXTENDED RELEASE ORAL DAILY
Qty: 90 TABLET | Refills: 2 | OUTPATIENT
Start: 2023-02-13

## 2023-02-13 NOTE — TELEPHONE ENCOUNTER
Called pt  She wants rx to stay at [facility] for now.  Her insurance is changing in a couple of months and will need to be transferred to [facility] eventually but not now.    Refused tx to [facility] at this time per pt info.    Apoorva Lawson RN on 2/13/2023 at 2:18 PM

## 2023-02-15 DIAGNOSIS — I73.00 RAYNAUD'S DISEASE WITHOUT GANGRENE: ICD-10-CM

## 2023-02-15 RX ORDER — NIFEDIPINE 30 MG
30 TABLET, EXTENDED RELEASE ORAL DAILY
Qty: 90 TABLET | Refills: 2 | OUTPATIENT
Start: 2023-02-15

## 2023-02-15 NOTE — TELEPHONE ENCOUNTER
"Requested Prescriptions   Pending Prescriptions Disp Refills     NIFEdipine ER (ADALAT CC) 30 MG 24 hr tablet 90 tablet 2     Sig: Take 1 tablet (30 mg) by mouth daily       Calcium Channel Blockers Protocol  Failed - 2/15/2023  4:36 PM        Failed - Normal serum creatinine on file in past 12 months     No lab results found.    Ok to refill medication if creatinine is low          Passed - Blood pressure under 140/90 in past 12 months     BP Readings from Last 3 Encounters:   11/03/22 118/60   11/01/21 108/62   10/27/20 110/68                 Passed - Recent (12 mo) or future (30 days) visit within the authorizing provider's specialty     Patient has had an office visit with the authorizing provider or a provider within the authorizing providers department within the previous 12 mos or has a future within next 30 days. See \"Patient Info\" tab in inbasket, or \"Choose Columns\" in Meds & Orders section of the refill encounter.              Passed - Medication is active on med list        Passed - Patient is age 18 or older        Passed - No active pregnancy on record        Passed - No positive pregnancy test in past 12 months           Last Written Prescription Date:  1/3/23  Last Fill Quantity: 90,  # refills: 2   Last office visit: 11/3/2022 with prescribing provider:  Rose Marie Montiel   Future Office Visit:        See previous note from 2/10/23:Called pt  She wants rx to stay at [facility] for now.  Her insurance is changing in a couple of months and will need to be transferred to [facility] eventually but not now.     Refused tx to [facility] at this time per pt info.     Apoorva Lawson RN on 2/13/2023 at 2:18 PM      rx denied.    Bertha Woods RN      "

## 2023-11-02 ASSESSMENT — ENCOUNTER SYMPTOMS
COUGH: 0
HEADACHES: 0
MYALGIAS: 0
DIZZINESS: 0
ARTHRALGIAS: 0
HEMATURIA: 0
DIARRHEA: 0
SORE THROAT: 0
JOINT SWELLING: 0
PARESTHESIAS: 0
NERVOUS/ANXIOUS: 1
FEVER: 0
SHORTNESS OF BREATH: 0
CHILLS: 1
PALPITATIONS: 1
CONSTIPATION: 0
DYSURIA: 0
WEAKNESS: 0
EYE PAIN: 0
ABDOMINAL PAIN: 0
BREAST MASS: 0
HEARTBURN: 0
NAUSEA: 0
FREQUENCY: 0
HEMATOCHEZIA: 0

## 2023-11-08 NOTE — PROGRESS NOTES
Elizabeth is a 33 year old  female who presents for annual exam.     Besides routine health maintenance, she has no other health concerns today .    HPI:  Elizabeth is doing well today. She is planning to do a marathon next year. She is planning to travel to [place] with her sister next week.       GYNECOLOGIC HISTORY:    Patient's last menstrual period was 10/18/2023.    Regular menses? yes  Menses every 28 days.  Length of menses: 6 days    Her current contraception method is: oral contraceptives and condoms.  She  reports that she has never smoked. She has never used smokeless tobacco.    Patient is sexually active.  STD testing offered?  Declined    Last PHQ-9 score on record =       2023     2:05 PM   PHQ-9 SCORE   PHQ-9 Total Score 1     Last GAD7 score on record =       2023     2:05 PM   DALILA-7 SCORE   Total Score 3     Alcohol Score = 2    HEALTH MAINTENANCE:    Overdue       Never done ADVANCE CARE PLANNING (Every 5 Years)     Never done HIV SCREENING (Once)     Never done HEPATITIS C SCREENING (Once)     2023 PHQ-2 (once per calendar year) (Yearly, January to December)  Last completed: Nov 3, 2022     SEP 1   2023 INFLUENZA VACCINE (1)  Last completed: Nov 3, 2022     SEP 1   2023 COVID-19 Vaccine ( season)  Last completed: 2022     OCT 27   2023 PAP (Every 3 Years)  Last completed: Oct 27, 2020     NOV 3   2023 YEARLY PREVENTIVE VISIT (Yearly)  Last completed: Nov 3, 2022        Upcoming       2024 DTAP/TDAP/TD IMMUNIZATION (7 - Td or Tdap)  Last completed: 2014     Health maintenance updated:  yes, flu shot today.    HISTORY:  OB History    Para Term  AB Living   0 0 0 0 0 0   SAB IAB Ectopic Multiple Live Births   0 0 0 0 0       Patient Active Problem List   Diagnosis    Acne    POD (perioral dermatitis)    Raynauds phenomenon     History reviewed. No pertinent surgical history.   Social History     Tobacco Use    Smoking status: Never  "   Smokeless tobacco: Never   Substance Use Topics    Alcohol use: Yes     Comment: Occasionally      Problem (# of Occurrences) Relation (Name,Age of Onset)    Alzheimer Disease (1) Maternal Grandmother    Thyroid Disease (1) Mother    Breast Cancer (1) Mother (45)    Hyperlipidemia (2) Mother, Maternal Grandmother           Negative family history of: Cancer              Current Outpatient Medications   Medication Sig    ferrous sulfate (IRON) 325 (65 FE) MG tablet Take 1 tablet (325 mg) by mouth 3 times daily (with meals)    multivitamin w/minerals (THERA-VIT-M) tablet Take 1 tablet by mouth daily    NIFEdipine ER (ADALAT CC) 30 MG 24 hr tablet Take 1 tablet (30 mg) by mouth daily    norgestimate-ethinyl estradiol (ORTHO-CYCLEN) 0.25-35 MG-MCG tablet Take 1 tablet by mouth daily     No current facility-administered medications for this visit.     No Known Allergies    Past medical, surgical, social and family histories were reviewed and updated in EPIC.    ROS:   12 point review of systems negative other than symptoms noted below or in the HPI.  No urinary frequency or dysuria, bladder or kidney problems    EXAM:  /64   Ht 1.772 m (5' 9.75\")   Wt 59.9 kg (132 lb)   LMP 10/18/2023   BMI 19.08 kg/m     BMI: Body mass index is 19.08 kg/m .    PHYSICAL EXAM:  Constitutional:   Appearance: Well nourished, well developed, alert, in no acute distress  Neck:  Lymph Nodes:  No lymphadenopathy present    Thyroid:  Gland size normal, nontender, no nodules or masses present  on palpation  Chest:  Respiratory Effort:  Breathing unlabored  Cardiovascular:    Heart: Auscultation:  Regular rate, normal rhythm, no murmurs present  Breasts: Inspection of Breasts:  No lymphadenopathy present., Palpation of Breasts and Axillae:  No masses present on palpation, no breast tenderness., Axillary Lymph Nodes:  No lymphadenopathy present., and No nodularity, asymmetry or nipple discharge " bilaterally.  Gastrointestinal:   Abdominal Examination:  Abdomen nontender to palpation, tone normal without rigidity or guarding, no masses present, umbilicus without lesions   Liver and Spleen:  No hepatomegaly present, liver nontender to palpation    Hernias:  No hernias present  Lymphatic: Lymph Nodes:  No other lymphadenopathy present  Skin:  General Inspection:  No rashes present, no lesions present, no areas of  discoloration  Neurologic:    Mental Status:  Oriented X3.  Normal strength and tone, sensory exam                grossly normal, mentation intact and speech normal.    Psychiatric:   Mentation appears normal and affect normal/bright.         Pelvic Exam:  External Genitalia:     Normal appearance for age, no discharge present, no tenderness present, no inflammatory lesions present, color normal  Vagina:     Normal vaginal vault without central or paravaginal defects, no discharge present, no inflammatory lesions present, no masses present  Bladder:     Nontender to palpation  Urethra:   Urethral Body:  Urethra palpation normal, urethra structural support normal   Urethral Meatus:  No erythema or lesions present  Cervix:     Appearance healthy, no lesions present, nontender to palpation, no bleeding present  Uterus:     Uterus: firm, normal sized and nontender, anteverted in position.   Adnexa:     No adnexal tenderness present, no adnexal masses present  Perineum:     Perineum within normal limits, no evidence of trauma, no rashes or skin lesions present  Anus:     Anus within normal limits, no hemorrhoids present  Inguinal Lymph Nodes:     No lymphadenopathy present  Pubic Hair:     Normal pubic hair distribution for age  Genitalia and Groin:     No rashes present, no lesions present, no areas of discoloration, no masses present    COUNSELING:   Reviewed preventive health counseling, as reflected in patient instructions    BMI: Body mass index is 19.08 kg/m .      ASSESSMENT:  33 year old female  with satisfactory annual exam.    ICD-10-CM    1. Encounter for gynecological examination without abnormal finding  Z01.419       2. Breakthrough bleeding on OCPs  N92.1       3. Raynaud's disease without gangrene  I73.00           PLAN:  Normal breast and pelvic exam today. Will recheck her iron level and also rule out vitamin deficiencies. She opts to return in the future for screening lipids.  Ok to continue on OCPs at this time  Discussed future referrals to Rheumatology for Raynaud's and also to Hematology for iron deficiency. Discussed possibility of gluten sensitivity and she has no GI symptoms with consumption of gluten and no family history of Celiac disease.    Rose Marie Montiel MD    Answers submitted by the patient for this visit:  Annual Preventive Visit (Submitted on 11/2/2023)  Chief Complaint: Annual Exam:  Frequency of exercise:: 4-5 days/week  Getting at least 3 servings of Calcium per day:: Yes  Diet:: Regular (no restrictions)  Taking medications regularly:: Yes  Medication side effects:: No significant flushing  Bi-annual eye exam:: Yes  Dental care twice a year:: Yes  Sleep apnea or symptoms of sleep apnea:: None  abdominal pain: No  Blood in stool: No  Blood in urine: No  chest pain: No  chills: Yes  congestion: No  constipation: No  cough: No  diarrhea: No  dizziness: No  ear pain: No  eye pain: No  nervous/anxious: Yes  fever: No  frequency: No  genital sores: No  headaches: No  hearing loss: No  heartburn: No  arthralgias: No  joint swelling: No  peripheral edema: No  mood changes: No  myalgias: No  nausea: No  dysuria: No  palpitations: Yes  Skin sensation changes: No  sore throat: No  urgency: No  rash: No  shortness of breath: No  visual disturbance: No  weakness: No  pelvic pain: No  vaginal bleeding: No  vaginal discharge: No  tenderness: No  breast mass: No  breast discharge: No  Additional concerns today:: Yes  Exercise outside of work (Submitted on 11/2/2023)  Chief Complaint:  Annual Exam:  Duration of exercise:: 45-60 minutes

## 2023-11-09 ENCOUNTER — OFFICE VISIT (OUTPATIENT)
Dept: OBGYN | Facility: CLINIC | Age: 33
End: 2023-11-09
Payer: COMMERCIAL

## 2023-11-09 VITALS
BODY MASS INDEX: 18.9 KG/M2 | WEIGHT: 132 LBS | HEIGHT: 70 IN | SYSTOLIC BLOOD PRESSURE: 118 MMHG | DIASTOLIC BLOOD PRESSURE: 64 MMHG

## 2023-11-09 DIAGNOSIS — Z23 NEED FOR PROPHYLACTIC VACCINATION AND INOCULATION AGAINST INFLUENZA: ICD-10-CM

## 2023-11-09 DIAGNOSIS — Z13.21 ENCOUNTER FOR VITAMIN DEFICIENCY SCREENING: ICD-10-CM

## 2023-11-09 DIAGNOSIS — I73.00 RAYNAUD'S DISEASE WITHOUT GANGRENE: ICD-10-CM

## 2023-11-09 DIAGNOSIS — N92.1 BREAKTHROUGH BLEEDING ON OCPS: ICD-10-CM

## 2023-11-09 DIAGNOSIS — Z01.419 ENCOUNTER FOR GYNECOLOGICAL EXAMINATION WITHOUT ABNORMAL FINDING: Primary | ICD-10-CM

## 2023-11-09 DIAGNOSIS — Z13.220 LIPID SCREENING: ICD-10-CM

## 2023-11-09 LAB
FERRITIN SERPL-MCNC: 24 NG/ML (ref 6–175)
IRON BINDING CAPACITY (ROCHE): 332 UG/DL (ref 240–430)
IRON SATN MFR SERPL: 25 % (ref 15–46)
IRON SERPL-MCNC: 82 UG/DL (ref 37–145)
VIT D+METAB SERPL-MCNC: 44 NG/ML (ref 20–50)

## 2023-11-09 PROCEDURE — 83540 ASSAY OF IRON: CPT | Performed by: OBSTETRICS & GYNECOLOGY

## 2023-11-09 PROCEDURE — 87624 HPV HI-RISK TYP POOLED RSLT: CPT | Performed by: OBSTETRICS & GYNECOLOGY

## 2023-11-09 PROCEDURE — 82728 ASSAY OF FERRITIN: CPT | Performed by: OBSTETRICS & GYNECOLOGY

## 2023-11-09 PROCEDURE — 90471 IMMUNIZATION ADMIN: CPT | Performed by: OBSTETRICS & GYNECOLOGY

## 2023-11-09 PROCEDURE — 82306 VITAMIN D 25 HYDROXY: CPT | Performed by: OBSTETRICS & GYNECOLOGY

## 2023-11-09 PROCEDURE — 83550 IRON BINDING TEST: CPT | Performed by: OBSTETRICS & GYNECOLOGY

## 2023-11-09 PROCEDURE — 90686 IIV4 VACC NO PRSV 0.5 ML IM: CPT | Performed by: OBSTETRICS & GYNECOLOGY

## 2023-11-09 PROCEDURE — 99395 PREV VISIT EST AGE 18-39: CPT | Mod: 25 | Performed by: OBSTETRICS & GYNECOLOGY

## 2023-11-09 PROCEDURE — G0145 SCR C/V CYTO,THINLAYER,RESCR: HCPCS | Performed by: OBSTETRICS & GYNECOLOGY

## 2023-11-09 PROCEDURE — 36415 COLL VENOUS BLD VENIPUNCTURE: CPT | Performed by: OBSTETRICS & GYNECOLOGY

## 2023-11-09 RX ORDER — NORGESTIMATE AND ETHINYL ESTRADIOL 0.25-0.035
1 KIT ORAL DAILY
Qty: 84 TABLET | Refills: 3 | Status: SHIPPED | OUTPATIENT
Start: 2023-11-09

## 2023-11-09 RX ORDER — NIFEDIPINE 30 MG
30 TABLET, EXTENDED RELEASE ORAL DAILY
Qty: 90 TABLET | Refills: 3 | Status: SHIPPED | OUTPATIENT
Start: 2023-11-09

## 2023-11-09 ASSESSMENT — ANXIETY QUESTIONNAIRES
IF YOU CHECKED OFF ANY PROBLEMS ON THIS QUESTIONNAIRE, HOW DIFFICULT HAVE THESE PROBLEMS MADE IT FOR YOU TO DO YOUR WORK, TAKE CARE OF THINGS AT HOME, OR GET ALONG WITH OTHER PEOPLE: SOMEWHAT DIFFICULT
GAD7 TOTAL SCORE: 3
5. BEING SO RESTLESS THAT IT IS HARD TO SIT STILL: NOT AT ALL
3. WORRYING TOO MUCH ABOUT DIFFERENT THINGS: NOT AT ALL
2. NOT BEING ABLE TO STOP OR CONTROL WORRYING: NOT AT ALL
6. BECOMING EASILY ANNOYED OR IRRITABLE: SEVERAL DAYS
7. FEELING AFRAID AS IF SOMETHING AWFUL MIGHT HAPPEN: NOT AT ALL
GAD7 TOTAL SCORE: 3
1. FEELING NERVOUS, ANXIOUS, OR ON EDGE: SEVERAL DAYS

## 2023-11-09 ASSESSMENT — PATIENT HEALTH QUESTIONNAIRE - PHQ9
5. POOR APPETITE OR OVEREATING: SEVERAL DAYS
SUM OF ALL RESPONSES TO PHQ QUESTIONS 1-9: 1

## 2023-11-09 NOTE — NURSING NOTE
Prior to immunization administration, verified patients identity using patient s name and date of birth. Please see Immunization Activity for additional information.     Screening Questionnaire for Adult Immunization    Are you sick today?   No   Do you have allergies to medications, food, a vaccine component or latex?   No   Have you ever had a serious reaction after receiving a vaccination?   No   Do you have a long-term health problem with heart, lung, kidney, or metabolic disease (e.g., diabetes), asthma, a blood disorder, no spleen, complement component deficiency, a cochlear implant, or a spinal fluid leak?  Are you on long-term aspirin therapy?   No   Do you have cancer, leukemia, HIV/AIDS, or any other immune system problem?   No   Do you have a parent, brother, or sister with an immune system problem?   No   In the past 3 months, have you taken medications that affect  your immune system, such as prednisone, other steroids, or anticancer drugs; drugs for the treatment of rheumatoid arthritis, Crohn s disease, or psoriasis; or have you had radiation treatments?   No   Have you had a seizure, or a brain or other nervous system problem?   No   During the past year, have you received a transfusion of blood or blood    products, or been given immune (gamma) globulin or antiviral drug?   No   For women: Are you pregnant or is there a chance you could become       pregnant during the next month?   No     Immunization questionnaire answers were all negative.  Influenza Vaccine >6 months (Alfuria,Fluzone)     Patient instructed to report any adverse reactions.     Screening performed by Alison Gustafson CMA on 11/9/2023 at 3:04 PM.

## 2023-11-15 LAB
BKR LAB AP GYN ADEQUACY: NORMAL
BKR LAB AP GYN INTERPRETATION: NORMAL
BKR LAB AP HPV REFLEX: NORMAL
BKR LAB AP PREVIOUS ABNORMAL: NORMAL
PATH REPORT.COMMENTS IMP SPEC: NORMAL
PATH REPORT.COMMENTS IMP SPEC: NORMAL
PATH REPORT.RELEVANT HX SPEC: NORMAL

## 2023-11-16 LAB
HUMAN PAPILLOMA VIRUS 16 DNA: NEGATIVE
HUMAN PAPILLOMA VIRUS 18 DNA: NEGATIVE
HUMAN PAPILLOMA VIRUS FINAL DIAGNOSIS: NORMAL
HUMAN PAPILLOMA VIRUS OTHER HR: NEGATIVE

## 2024-11-06 DIAGNOSIS — N92.1 BREAKTHROUGH BLEEDING ON OCPS: ICD-10-CM

## 2024-11-06 NOTE — TELEPHONE ENCOUNTER
Requested Prescriptions   Pending Prescriptions Disp Refills    norgestimate-ethinyl estradiol (ORTHO-CYCLEN) 0.25-35 MG-MCG tablet 84 tablet 3     Sig: Take 1 tablet by mouth daily.       Contraceptives Protocol Passed - 11/6/2024  1:56 PM        Passed - Patient is not a current smoker if age is 35 or older        Passed - Medication is active on med list        Passed - Recent (12 mo) or future (90 days) visit within the authorizing provider's specialty     The patient must have completed an in-person or virtual visit within the past 12 months or has a future visit scheduled within the next 90 days with the authorizing provider s specialty.  Urgent care and e-visits do not quality as an office visit for this protocol.          Passed - Medication indicated for associated diagnosis     Medication is associated with one or more of the following diagnoses:  Contraception  Acne  Dysmenorrhea  Menorrhagia  Amenorrhea  PCOS  Premenstrual Dysphoric Disorder  Irregular menses  Endometriosis  Contraceptive counseling  Finding of menstrual bleeding  Education about oral contraception  Uses contraception  Initial prescription of oral contraception  Oral contraception-no problem  Oral contraceptive repeat          Passed - No active pregnancy on record        Passed - No positive pregnancy test in past 12 months           Last annual 11/9/23  One month approved  Appointment needed for further refills  Prudence Rice RN on 11/7/2024 at 9:32 AM

## 2024-11-07 RX ORDER — NORGESTIMATE AND ETHINYL ESTRADIOL 0.25-0.035
1 KIT ORAL DAILY
Qty: 28 TABLET | Refills: 0 | Status: SHIPPED | OUTPATIENT
Start: 2024-11-07

## 2024-11-25 DIAGNOSIS — I73.00 RAYNAUD'S DISEASE WITHOUT GANGRENE: ICD-10-CM

## 2024-11-25 RX ORDER — NIFEDIPINE 30 MG
30 TABLET, EXTENDED RELEASE ORAL DAILY
Qty: 90 TABLET | Refills: 3 | OUTPATIENT
Start: 2024-11-25

## 2024-11-25 NOTE — TELEPHONE ENCOUNTER
Requested Prescriptions   Pending Prescriptions Disp Refills    NIFEdipine ER (ADALAT CC) 30 MG 24 hr tablet 90 tablet 3     Sig: Take 1 tablet (30 mg) by mouth daily.       Calcium Channel Blockers Protocol  Failed - 11/25/2024  8:40 AM        Failed - Most recent blood pressure under 140/90 in past 12 months     BP Readings from Last 3 Encounters:   11/09/23 118/64   11/03/22 118/60   11/01/21 108/62       No data recorded            Failed - GFR is on file in the past 12 months and most recent GFR is normal        Failed - Recent (12 mo) or future (90 days) visit within the authorizing provider's specialty     The patient must have completed an in-person or virtual visit within the past 12 months or has a future visit scheduled within the next 90 days with the authorizing provider s specialty.  Urgent care and e-visits do not qualify as an office visit for this protocol.          Passed - Medication is active on med list        Passed - Patient is age 18 or older        Passed - No active pregnancy on record        Passed - No positive pregnancy test in past 12 months           Last Written Prescription Date:  11/09/23  Last Fill Quantity: 90,  # refills: 3   Last office visit: 11/9/2023 ; last virtual visit: Visit date not found with prescribing provider:  Tiana   Future Office Visit:

## 2024-11-25 NOTE — TELEPHONE ENCOUNTER
Rx denied.  Pt overdue for annual, provider no longer in clinic  Needs appointment for further refills    Suma Martin RN on 11/25/2024 at 9:43 AM  [facility] Triage

## 2024-12-10 DIAGNOSIS — I73.00 RAYNAUD'S DISEASE WITHOUT GANGRENE: ICD-10-CM

## 2024-12-10 RX ORDER — NIFEDIPINE 30 MG
30 TABLET, EXTENDED RELEASE ORAL DAILY
Qty: 30 TABLET | Refills: 1 | Status: SHIPPED | OUTPATIENT
Start: 2024-12-10

## 2024-12-10 NOTE — TELEPHONE ENCOUNTER
Last OV 11/9/23 with Dr. Montiel:    3. Raynaud's disease without gangrene  I73.00 NIFEdipine ER (ADALAT CC) 30 MG 24 hr tablet         NIFEdipine ER (ADALAT CC) 30 MG 24 hr tablet     Last Written Prescription Date:  11/9/23  Last Fill Quantity: 90,  # refills: 3   Last office visit: 11/9/2023 ; last virtual visit: Visit date not found with prescribing provider:  Tiana    Future Office Visit:  1/22/25 with Dr. Ansari     Pt takes NIFEdipine for Raynaud's Disease  Previous Tiana pt     Due for annual - has upcoming appointment with Dr. Ansari on 1/22/25, but will run out of medication a few weeks prior to visit     Routing to provider to advise -    Willing to provide short term refill to get to pts upcoming appt?  Prefer pt f/up with PCP to manage this medication?    Suma Martin RN on 12/10/2024 at 3:18 PM  [facility] Triage

## 2024-12-10 NOTE — TELEPHONE ENCOUNTER
[facility] Call Center    Phone Message    May a detailed message be left on voicemail: yes     Reason for Call: Medication Refill Request    Has the patient contacted the pharmacy for the refill? Yes   Name of medication being requested: NIFEdipine ER (ADALAT CC) 30 MG 24 hr tablet   Provider who prescribed the medication:   Pharmacy: [facility] - [address]  Date medication is needed: pt has an annual physical on 1/22/25, she thinks she will run out of medication 1-2 weeks prior to this visit and is wondering if she can get a refill, please reach out to Elizabeth, thank you!       Action Taken: Message routed to:  Other: obgyn    Travel Screening: Not Applicable     Date of Service:

## 2024-12-26 DIAGNOSIS — N92.1 BREAKTHROUGH BLEEDING ON OCPS: ICD-10-CM

## 2024-12-26 RX ORDER — NORGESTIMATE AND ETHINYL ESTRADIOL 0.25-0.035
1 KIT ORAL DAILY
Qty: 28 TABLET | Refills: 0 | Status: SHIPPED | OUTPATIENT
Start: 2024-12-26

## 2024-12-26 NOTE — TELEPHONE ENCOUNTER
Last Written Prescription Date:  11/7/24  Last Fill Quantity: 28,  # refills: 0   Last office visit: 11/9/2023 ; last virtual visit: Visit date not found with prescribing provider:  Dr Montiel   Future Office Visit: 1/22/25 with Dr Ansari        Requested Prescriptions   Pending Prescriptions Disp Refills    norgestimate-ethinyl estradiol (ORTHO-CYCLEN) 0.25-35 MG-MCG tablet 28 tablet 0     Sig: Take 1 tablet by mouth daily.       Contraceptives Protocol Failed - 12/26/2024  2:14 PM        Failed - Recent (12 mo) or future (90 days) visit within the authorizing provider's specialty     The patient must have completed an in-person or virtual visit within the past 12 months or has a future visit scheduled within the next 90 days with the authorizing provider s specialty.  Urgent care and e-visits do not qualify as an office visit for this protocol.          Passed - Patient is not a current smoker if age is 35 or older        Passed - Medication is active on med list        Passed - Medication indicated for associated diagnosis     Medication is associated with one or more of the following diagnoses:  Contraception  Acne  Dysmenorrhea  Menorrhagia  Amenorrhea  PCOS  Premenstrual Dysphoric Disorder  Irregular menses  Endometriosis  Contraceptive counseling  Finding of menstrual bleeding  Education about oral contraception  Uses contraception  Initial prescription of oral contraception  Oral contraception-no problem  Oral contraceptive repeat          Passed - No active pregnancy on record        Passed - No positive pregnancy test in past 12 months           Pt was already given a refill extension. She does have upcoming appointment scheduled 1/22/25 with Dr Ansari since Tiana isnt here any longer.

## 2024-12-26 NOTE — TELEPHONE ENCOUNTER
1/22/25 future visit w Dr Ansari    Willing to send a refill extension?    Apoorva Lawson RN on 12/26/2024 at 2:27 PM

## 2025-01-11 ENCOUNTER — HEALTH MAINTENANCE LETTER (OUTPATIENT)
Age: 35
End: 2025-01-11

## 2025-01-17 NOTE — PROGRESS NOTES
Elizabeth is a 34 year old  female who presents for annual exam.     Besides routine health maintenance, she has no other health concerns today.    HPI:  Here today for yearly exam --doing well.  Former patient of Dr. Montiel.   Regular monthly menses x 3-5d.  Stopped her OCP about 6 months ago to see if it had any effect on her Raynauds.  No change in symptoms but much worse acne and would like to restart.   No intermenstrual bleeding/spotting.  +SA --no issues.  Occ pain/discomfort with deeper penetration.  NO bowel/bladder issues.  A bit more urgency.  Inevitably has to run to the bathroom when just getting home.     (x 5yrs); works in [employer] for [employer]; no children  -staying active; did first marathon last summer with Grandma's; doing more yoga currently  +SBE --no issues;   +fam hx breast in her mother (age 45) and her paternal grandfather --unsure of any genetic testing  -has been on daily nifedipine with Dr. Montiel for Raynauds; has never seen rheum but open to a visit  -has had both flu and covid vaccines      GYNECOLOGIC HISTORY:    Patient's last menstrual period was 2025.    Regular menses? yes  Menses every 28 days.  Length of menses: 4-5 days    Her current contraception method is: condoms.  She  reports that she has never smoked. She has never used smokeless tobacco.    Patient is sexually active.  STD testing offered?  Declined    Last PHQ-9 score on record =       2025     4:40 PM   PHQ-9 SCORE   PHQ-9 Total Score 1     Last GAD7 score on record =       2025     4:40 PM   DALILA-7 SCORE   Total Score 6     Alcohol Score = 2    HEALTH MAINTENANCE:  Cholesterol:   Cholesterol   Date Value Ref Range Status   2022 223 (H) <200 mg/dL Final     Last Mammo: Not applicable, Result: Not applicable, Next Mammo: Due at age 42   Pap:   Lab Results   Component Value Date    GYNINTERP  2023     Negative for Intraepithelial Lesion or Malignancy (NILM)    PAP NIL  "10/27/2020    PAP NIL 2017    PAP NIL 2014      Colonoscopy:  NA, Result: Not applicable, Next Colonoscopy: Age 47 years.  Dexa:  NA    Health maintenance updated:  yes    HISTORY:  OB History    Para Term  AB Living   0 0 0 0 0 0   SAB IAB Ectopic Multiple Live Births   0 0 0 0 0       Patient Active Problem List   Diagnosis    Acne    POD (perioral dermatitis)    Raynauds phenomenon    Uses oral contraception     History reviewed. No pertinent surgical history.   Social History     Tobacco Use    Smoking status: Never    Smokeless tobacco: Never   Substance Use Topics    Alcohol use: Yes     Comment: Occasionally      Problem (# of Occurrences) Relation (Name,Age of Onset)    Alzheimer Disease (1) Maternal Grandmother    Thyroid Disease (1) Mother    Breast Cancer (1) Mother (45)    Hyperlipidemia (2) Mother, Maternal Grandmother           Negative family history of: Cancer              Current Outpatient Medications   Medication Sig Dispense Refill    ferrous sulfate (IRON) 325 (65 FE) MG tablet Take 1 tablet (325 mg) by mouth 3 times daily (with meals) 90 tablet 2    multivitamin w/minerals (THERA-VIT-M) tablet Take 1 tablet by mouth daily      NIFEdipine ER (ADALAT CC) 30 MG 24 hr tablet Take 1 tablet (30 mg) by mouth daily. 90 tablet 3    norgestimate-ethinyl estradiol (ORTHO-CYCLEN) 0.25-35 MG-MCG tablet Take 1 tablet by mouth daily. 84 tablet 4     No current facility-administered medications for this visit.     No Known Allergies    Past medical, surgical, social and family histories were reviewed and updated in EPIC.    ROS:   No urinary frequency or dysuria, bladder or kidney problems, Normal menstrual cycles    EXAM:  /68   Ht 1.765 m (5' 9.5\")   Wt 60.8 kg (134 lb)   LMP 2025   BMI 19.50 kg/m     BMI: Body mass index is 19.5 kg/m .    PHYSICAL EXAM:  Constitutional:   Appearance: Well nourished, well developed, alert, in no acute distress  Neck:  Lymph Nodes:  " No lymphadenopathy present    Thyroid:  Gland size normal, nontender, no nodules or masses present  on palpation  Chest:  Respiratory Effort:  Breathing unlabored  Cardiovascular:    Heart: Auscultation:  Regular rate, normal rhythm, no murmurs present  Breasts: Palpation of Breasts and Axillae:  No masses present on palpation, no breast tenderness., Axillary Lymph Nodes:  No lymphadenopathy present., and No nodularity, asymmetry or nipple discharge bilaterally.  Gastrointestinal:   Abdominal Examination:  Abdomen nontender to palpation, tone normal without rigidity or guarding, no masses present, umbilicus without lesions   Liver and Spleen:  No hepatomegaly present, liver nontender to palpation    Hernias:  No hernias present  Lymphatic: Lymph Nodes:  No other lymphadenopathy present  Skin:  General Inspection:  No rashes present, no lesions present, no areas of  discoloration  Neurologic:    Mental Status:  Oriented X3.  Normal strength and tone, sensory exam                grossly normal, mentation intact and speech normal.    Psychiatric:   Mentation appears normal and affect normal/bright.         Pelvic Exam:  External Genitalia:     Normal appearance for age, no discharge present, no tenderness present, no inflammatory lesions present, color normal  Vagina:     Normal vaginal vault without central or paravaginal defects, no discharge present, no inflammatory lesions present, no masses present  Bladder:     Nontender to palpation  Urethra:   Urethral Body:  Urethra palpation normal, urethra structural support normal   Urethral Meatus:  No erythema or lesions present  Cervix:     Appearance healthy, no lesions present, nontender to palpation, no bleeding present  Uterus:     Uterus: firm, normal sized and nontender, retroverted in position.   Adnexa:     No adnexal tenderness present, no adnexal masses present  Perineum:     Perineum within normal limits, no evidence of trauma, no rashes or skin lesions  present  Anus:     Anus within normal limits, no hemorrhoids present  Inguinal Lymph Nodes:     No lymphadenopathy present  Pubic Hair:     Normal pubic hair distribution for age  Genitalia and Groin:     No rashes present, no lesions present, no areas of discoloration, no masses present    COUNSELING:   Reviewed preventive health counseling, as reflected in patient instructions  Special attention given to:        Regular exercise       Healthy diet/nutrition       Contraception       Family planning       Breast cancer screening    BMI: Body mass index is 19.5 kg/m .      ASSESSMENT:  34 year old female with satisfactory annual exam.    ICD-10-CM    1. Encounter for gynecological examination without abnormal finding  Z01.419       2. Raynaud's disease without gangrene  I73.00 NIFEdipine ER (ADALAT CC) 30 MG 24 hr tablet      3. Breakthrough bleeding on OCPs  N92.1 norgestimate-ethinyl estradiol (ORTHO-CYCLEN) 0.25-35 MG-MCG tablet          PLAN:  Patient Instructions   Follow up with your primary care provider for your other medical problems.  Continue self breast exam.  Increase physical activity and exercise.  Usual safety and preventative measures counseling done.  Last pap smear (2023) was normal and negative for the DNA of high risk HPV subtypes.  No pap was obtained this year.  This was discussed with the patient and she agrees with the plan.  Will meet with rhuematology to discuss Raynauds and possible management changes based on symptoms.  Card given for [facility]   Will restart OCP this week; rx sent.      Lenore Ansari MD

## 2025-01-20 DIAGNOSIS — I73.00 RAYNAUD'S DISEASE WITHOUT GANGRENE: ICD-10-CM

## 2025-01-21 RX ORDER — NIFEDIPINE 30 MG
30 TABLET, EXTENDED RELEASE ORAL DAILY
Qty: 60 TABLET | Refills: 5 | OUTPATIENT
Start: 2025-01-21

## 2025-01-21 NOTE — TELEPHONE ENCOUNTER
Requested Prescriptions   Pending Prescriptions Disp Refills    NIFEdipine ER (ADALAT CC) 30 MG 24 hr tablet [Pharmacy Med Name: NIFEDIPINE ER (CC) TABS 30MG] 60 tablet 5     Sig: TAKE 1 TABLET DAILY       Calcium Channel Blockers Protocol  Failed - 1/21/2025  9:16 AM        Failed - Most recent blood pressure under 140/90 in past 12 months     BP Readings from Last 3 Encounters:   11/09/23 118/64   11/03/22 118/60   11/01/21 108/62       No data recorded            Failed - GFR is on file in the past 12 months and most recent GFR is normal        Passed - Medication is active on med list        Passed - Recent (12 mo) or future (90 days) visit within the authorizing provider's specialty     The patient must have completed an in-person or virtual visit within the past 12 months or has a future visit scheduled within the next 90 days with the authorizing provider s specialty.  Urgent care and e-visits do not qualify as an office visit for this protocol.          Passed - Patient is age 18 or older        Passed - No active pregnancy on record        Passed - No positive pregnancy test in past 12 months           Last Written Prescription Date:  12/10/24  Last Fill Quantity: 30,  # refills: 1   Last office visit: 11/9/2023 ; last virtual visit: Visit date not found with prescribing provider:  Dr Ansari   Future Office Visit:  1/22/2025    Refused - will address at tomorrows visit    Apoorva Lawson RN on 1/21/2025 at 9:20 AM

## 2025-01-22 ENCOUNTER — OFFICE VISIT (OUTPATIENT)
Dept: OBGYN | Facility: CLINIC | Age: 35
End: 2025-01-22
Attending: OBSTETRICS & GYNECOLOGY
Payer: COMMERCIAL

## 2025-01-22 VITALS
BODY MASS INDEX: 19.18 KG/M2 | WEIGHT: 134 LBS | SYSTOLIC BLOOD PRESSURE: 118 MMHG | DIASTOLIC BLOOD PRESSURE: 68 MMHG | HEIGHT: 70 IN

## 2025-01-22 DIAGNOSIS — I73.00 RAYNAUD'S DISEASE WITHOUT GANGRENE: ICD-10-CM

## 2025-01-22 DIAGNOSIS — Z01.419 ENCOUNTER FOR GYNECOLOGICAL EXAMINATION WITHOUT ABNORMAL FINDING: Primary | ICD-10-CM

## 2025-01-22 DIAGNOSIS — N92.1 BREAKTHROUGH BLEEDING ON OCPS: ICD-10-CM

## 2025-01-22 PROCEDURE — 99395 PREV VISIT EST AGE 18-39: CPT | Performed by: OBSTETRICS & GYNECOLOGY

## 2025-01-22 RX ORDER — NORGESTIMATE AND ETHINYL ESTRADIOL 0.25-0.035
1 KIT ORAL DAILY
Qty: 84 TABLET | Refills: 4 | Status: SHIPPED | OUTPATIENT
Start: 2025-01-22

## 2025-01-22 RX ORDER — NIFEDIPINE 30 MG
30 TABLET, EXTENDED RELEASE ORAL DAILY
Qty: 90 TABLET | Refills: 3 | Status: SHIPPED | OUTPATIENT
Start: 2025-01-22

## 2025-01-22 ASSESSMENT — ANXIETY QUESTIONNAIRES
GAD7 TOTAL SCORE: 6
7. FEELING AFRAID AS IF SOMETHING AWFUL MIGHT HAPPEN: SEVERAL DAYS
IF YOU CHECKED OFF ANY PROBLEMS ON THIS QUESTIONNAIRE, HOW DIFFICULT HAVE THESE PROBLEMS MADE IT FOR YOU TO DO YOUR WORK, TAKE CARE OF THINGS AT HOME, OR GET ALONG WITH OTHER PEOPLE: SOMEWHAT DIFFICULT
5. BEING SO RESTLESS THAT IT IS HARD TO SIT STILL: NOT AT ALL
GAD7 TOTAL SCORE: 6
2. NOT BEING ABLE TO STOP OR CONTROL WORRYING: SEVERAL DAYS
1. FEELING NERVOUS, ANXIOUS, OR ON EDGE: SEVERAL DAYS
3. WORRYING TOO MUCH ABOUT DIFFERENT THINGS: SEVERAL DAYS
6. BECOMING EASILY ANNOYED OR IRRITABLE: SEVERAL DAYS

## 2025-01-22 ASSESSMENT — PATIENT HEALTH QUESTIONNAIRE - PHQ9
SUM OF ALL RESPONSES TO PHQ QUESTIONS 1-9: 1
5. POOR APPETITE OR OVEREATING: SEVERAL DAYS

## 2025-01-22 NOTE — PATIENT INSTRUCTIONS
Follow up with your primary care provider for your other medical problems.  Continue self breast exam.  Increase physical activity and exercise.  Usual safety and preventative measures counseling done.  Last pap smear (2023) was normal and negative for the DNA of high risk HPV subtypes.  No pap was obtained this year.  This was discussed with the patient and she agrees with the plan.  Will meet with rhuematology to discuss Raynauds and possible management changes based on symptoms.  Card given for [facility]   Will restart OCP this week; rx sent.

## 2025-03-25 ENCOUNTER — TELEPHONE (OUTPATIENT)
Dept: OBGYN | Facility: CLINIC | Age: 35
End: 2025-03-25
Payer: COMMERCIAL

## 2025-03-25 DIAGNOSIS — I73.00 RAYNAUD'S DISEASE WITHOUT GANGRENE: Primary | ICD-10-CM

## 2025-03-25 NOTE — TELEPHONE ENCOUNTER
[facility] Call Center    Phone Message    May a detailed message be left on voicemail: yes     Reason for Call: Form or Letter   Type or form/letter needing completion: Rheumatology Referral (needs formal letter for insurance purposes but was referred in January when she saw Dr Ansari)  Provider: Lenore Ansari MD  Date form needed: ASAP -has appt next week  Once completed: Fax form to: [phone]    Action Taken: Message routed to:  Other: [facility]    Travel Screening: Not Applicable

## 2025-03-25 NOTE — TELEPHONE ENCOUNTER
1/22/25 OV w Dr Ansari  Will meet with rhuematology to discuss Raynauds and possible management changes based on symptoms. Card given for [facility]     RN placed official referral and faxed to number provided by patient.    Left detailed vm informing pt referral faxed.    Apoorva Lawson RN on 3/25/2025 at 12:37 PM         Spontaneous, unlabored and symmetrical

## 2025-04-01 ENCOUNTER — TRANSFERRED RECORDS (OUTPATIENT)
Dept: HEALTH INFORMATION MANAGEMENT | Facility: CLINIC | Age: 35
End: 2025-04-01
Payer: COMMERCIAL